# Patient Record
Sex: FEMALE | Race: WHITE | NOT HISPANIC OR LATINO | URBAN - METROPOLITAN AREA
[De-identification: names, ages, dates, MRNs, and addresses within clinical notes are randomized per-mention and may not be internally consistent; named-entity substitution may affect disease eponyms.]

---

## 2024-04-22 ENCOUNTER — OFFICE VISIT (OUTPATIENT)
Dept: SPORTS MEDICINE | Facility: CLINIC | Age: 59
End: 2024-04-22
Payer: COMMERCIAL

## 2024-04-22 VITALS
BODY MASS INDEX: 23.79 KG/M2 | DIASTOLIC BLOOD PRESSURE: 62 MMHG | SYSTOLIC BLOOD PRESSURE: 108 MMHG | HEIGHT: 59 IN | HEART RATE: 62 BPM | WEIGHT: 118 LBS

## 2024-04-22 DIAGNOSIS — M48.07 NEURAL FORAMINAL STENOSIS OF LUMBOSACRAL SPINE: ICD-10-CM

## 2024-04-22 DIAGNOSIS — M62.89 HAMSTRING TIGHTNESS: ICD-10-CM

## 2024-04-22 DIAGNOSIS — M48.00 CENTRAL STENOSIS OF SPINAL CANAL: Primary | ICD-10-CM

## 2024-04-22 DIAGNOSIS — M54.42 ACUTE MIDLINE LOW BACK PAIN WITH LEFT-SIDED SCIATICA: ICD-10-CM

## 2024-04-22 DIAGNOSIS — M51.36 BULGING LUMBAR DISC: ICD-10-CM

## 2024-04-22 DIAGNOSIS — G96.191 TARLOV CYST: ICD-10-CM

## 2024-04-22 DIAGNOSIS — M53.3 COCCYGEAL PAIN: ICD-10-CM

## 2024-04-22 DIAGNOSIS — M99.04 SACROILIAC JOINT SOMATIC DYSFUNCTION: ICD-10-CM

## 2024-04-22 DIAGNOSIS — G96.191 TARLOV CYSTS: ICD-10-CM

## 2024-04-22 DIAGNOSIS — M24.559 HIP FLEXOR TIGHTNESS, UNSPECIFIED LATERALITY: ICD-10-CM

## 2024-04-22 DIAGNOSIS — M51.36 ANNULAR TEAR OF LUMBAR DISC: ICD-10-CM

## 2024-04-22 DIAGNOSIS — S33.2XXD DISLOCATION OF COCCYX, SUBSEQUENT ENCOUNTER: ICD-10-CM

## 2024-04-22 DIAGNOSIS — M21.079 VALGUS FOOT, UNSPECIFIED LATERALITY: ICD-10-CM

## 2024-04-22 DIAGNOSIS — S39.012D LUMBOSACRAL STRAIN, SUBSEQUENT ENCOUNTER: ICD-10-CM

## 2024-04-22 DIAGNOSIS — M51.16 LUMBAR DISC HERNIATION WITH RADICULOPATHY: ICD-10-CM

## 2024-04-22 DIAGNOSIS — M21.70 LEG LENGTH DISCREPANCY: ICD-10-CM

## 2024-04-22 DIAGNOSIS — M54.10 RADICULITIS WITH LOWER EXTREMITY SYMPTOMS: ICD-10-CM

## 2024-04-22 PROBLEM — S39.012A LUMBOSACRAL STRAIN: Status: ACTIVE | Noted: 2024-04-22

## 2024-04-22 PROBLEM — S33.2XXA: Status: ACTIVE | Noted: 2024-04-22

## 2024-04-22 PROBLEM — M51.369 BULGING LUMBAR DISC: Status: ACTIVE | Noted: 2024-04-22

## 2024-04-22 PROBLEM — M51.369 ANNULAR TEAR OF LUMBAR DISC: Status: ACTIVE | Noted: 2024-04-22

## 2024-04-22 PROCEDURE — 99214 OFFICE O/P EST MOD 30 MIN: CPT | Mod: 25 | Performed by: FAMILY MEDICINE

## 2024-04-22 PROCEDURE — 1036F TOBACCO NON-USER: CPT | Performed by: FAMILY MEDICINE

## 2024-04-22 PROCEDURE — 99214 OFFICE O/P EST MOD 30 MIN: CPT | Performed by: FAMILY MEDICINE

## 2024-04-22 PROCEDURE — 2500000004 HC RX 250 GENERAL PHARMACY W/ HCPCS (ALT 636 FOR OP/ED): Performed by: FAMILY MEDICINE

## 2024-04-22 PROCEDURE — 96372 THER/PROPH/DIAG INJ SC/IM: CPT | Performed by: FAMILY MEDICINE

## 2024-04-22 RX ORDER — MULTIVIT-MINS NO.7/FOLIC ACID 1 MG
1 CAPSULE ORAL
COMMUNITY
Start: 2024-01-22

## 2024-04-22 RX ORDER — PREDNISONE 10 MG/1
10 TABLET ORAL DAILY
Qty: 30 TABLET | Refills: 0 | Status: SHIPPED | OUTPATIENT
Start: 2024-04-22 | End: 2024-05-22

## 2024-04-22 RX ORDER — TRAMADOL HYDROCHLORIDE 50 MG/1
50 TABLET ORAL EVERY 8 HOURS PRN
Qty: 15 TABLET | Refills: 0 | Status: SHIPPED | OUTPATIENT
Start: 2024-04-22 | End: 2024-04-27

## 2024-04-22 RX ORDER — METHYLPREDNISOLONE ACETATE 40 MG/ML
40 INJECTION, SUSPENSION INTRA-ARTICULAR; INTRALESIONAL; INTRAMUSCULAR; SOFT TISSUE ONCE
Status: COMPLETED | OUTPATIENT
Start: 2024-04-22 | End: 2024-04-22

## 2024-04-22 RX ORDER — LEVOTHYROXINE SODIUM 88 UG/1
88 TABLET ORAL
COMMUNITY
Start: 2023-07-25

## 2024-04-22 RX ORDER — BUSPIRONE HYDROCHLORIDE 5 MG/1
5 TABLET ORAL 2 TIMES DAILY
COMMUNITY

## 2024-04-22 RX ORDER — KETOROLAC TROMETHAMINE 30 MG/ML
60 INJECTION, SOLUTION INTRAMUSCULAR; INTRAVENOUS ONCE
Status: DISCONTINUED | OUTPATIENT
Start: 2024-04-22 | End: 2024-04-22

## 2024-04-22 RX ORDER — ACETAMINOPHEN 325 MG/1
650 TABLET ORAL EVERY 6 HOURS PRN
COMMUNITY

## 2024-04-22 RX ORDER — CYCLOBENZAPRINE HCL 10 MG
1 TABLET ORAL
COMMUNITY
Start: 2023-03-30 | End: 2024-04-25 | Stop reason: SDUPTHER

## 2024-04-22 RX ORDER — ORPHENADRINE CITRATE 30 MG/ML
60 INJECTION INTRAMUSCULAR; INTRAVENOUS ONCE
Status: COMPLETED | OUTPATIENT
Start: 2024-04-22 | End: 2024-04-22

## 2024-04-22 RX ORDER — ESCITALOPRAM OXALATE 5 MG/1
5 TABLET ORAL EVERY 24 HOURS
COMMUNITY
Start: 2023-02-06

## 2024-04-22 RX ADMIN — ORPHENADRINE CITRATE 60 MG: 60 INJECTION INTRAMUSCULAR; INTRAVENOUS at 13:17

## 2024-04-22 RX ADMIN — METHYLPREDNISOLONE ACETATE 40 MG: 40 INJECTION, SUSPENSION INTRA-ARTICULAR; INTRALESIONAL; INTRAMUSCULAR; SOFT TISSUE at 13:16

## 2024-04-22 RX ADMIN — METHYLPREDNISOLONE SODIUM SUCCINATE 125 MG: 125 INJECTION, POWDER, FOR SOLUTION INTRAMUSCULAR; INTRAVENOUS at 13:16

## 2024-04-22 ASSESSMENT — PAIN SCALES - GENERAL
PAINLEVEL_OUTOF10: 6
PAINLEVEL: 6

## 2024-04-22 ASSESSMENT — PAIN DESCRIPTION - DESCRIPTORS: DESCRIPTORS: ACHING;THROBBING;SORE;TIGHTNESS

## 2024-04-22 ASSESSMENT — ENCOUNTER SYMPTOMS
LOSS OF SENSATION IN FEET: 0
DEPRESSION: 0
OCCASIONAL FEELINGS OF UNSTEADINESS: 0

## 2024-04-22 ASSESSMENT — LIFESTYLE VARIABLES
HOW OFTEN DO YOU HAVE A DRINK CONTAINING ALCOHOL: 2-4 TIMES A MONTH
HOW MANY STANDARD DRINKS CONTAINING ALCOHOL DO YOU HAVE ON A TYPICAL DAY: 1 OR 2
AUDIT TOTAL SCORE: 2
HAVE YOU OR SOMEONE ELSE BEEN INJURED AS A RESULT OF YOUR DRINKING: NO
SKIP TO QUESTIONS 9-10: 1
HAS A RELATIVE, FRIEND, DOCTOR, OR ANOTHER HEALTH PROFESSIONAL EXPRESSED CONCERN ABOUT YOUR DRINKING OR SUGGESTED YOU CUT DOWN: NO
HOW OFTEN DO YOU HAVE SIX OR MORE DRINKS ON ONE OCCASION: NEVER
AUDIT-C TOTAL SCORE: 2

## 2024-04-22 ASSESSMENT — PAIN - FUNCTIONAL ASSESSMENT: PAIN_FUNCTIONAL_ASSESSMENT: 0-10

## 2024-04-22 ASSESSMENT — PATIENT HEALTH QUESTIONNAIRE - PHQ9
1. LITTLE INTEREST OR PLEASURE IN DOING THINGS: NOT AT ALL
2. FEELING DOWN, DEPRESSED OR HOPELESS: NOT AT ALL
SUM OF ALL RESPONSES TO PHQ9 QUESTIONS 1 AND 2: 0

## 2024-04-22 NOTE — PROGRESS NOTES
Verbal consent of the patient and/or verbal parental consent for patients under the age of 18 have been obtained to conduct a physical examination at this office visit.    Established patient  History Of Present Illness  04/22/24 Nelda Parra is a 58 y.o. female who presents for reevaluation of LUMBAR spine. The patient reports reinjuring her low back after lifting a 5 gallon jug of wine yesterday afternoon. She reports feeling a pop and tightness across her entire lower back with excruciating pain which radiated down her legs RIGHT > LEFT causing her to fall to the floor.    She says her  had to help her off of the floor because of the pain.    She reached out to me over the weekend through the answering service yesterday and I had her start icing and putting heat on the area as well as taking some leftover muscle relaxant she had and the Tylenol.  She states after taking several doses of Tylenol, cyclobenzaprine, and using her heating pad that she has some improvement to the pain. The pain currently is located on the RIGHT side of her lower back more than the LEFT with some pain wrapping around the hips. She denies any radicular pain or symptoms in her legs at this time. The pain is 6/10 at rest but will increase with walking, twisting or bending motions. She continues to wear her heel lift in her LEFT shoe as previously directed. We discussed her mechanism of injury and reviewed proper lifting techniques and body posture. She has thoracic and lumbar side bending to the LEFT due to RIGHT paraspinal muscle spasms. She reports some decreased sensation in the dermatomes of L4-L5 and L5-S1 on the RIGHT and likely re-aggravated her L4 disc.   Her daughter is getting  this weekend who is also a sports medicine physician Dr. Amina Parra however she does not want her to know that her mom flared up her back again because then she will be worried about it.  We talked in detail about getting her back into  physical therapy her  is currently in physical therapy so we will going to switch up the appointments and have her take his place and his therapy appointments this week to get some traction in the future we will going to update her x-rays as well as her MRI of her lumbar spine.    All previous Progress Notes and imaging results related to this patients chief complaint have been reviewed in preparation for this examination.    Past Medical History  She has no past medical history on file.    Surgical History  She has a past surgical history that includes MR arthrogram shoulder right w FL guided injection (Right, 12/18/2013) and MR angio head wo IV contrast (1/15/2020).     Social History  She reports that she has never smoked. She has never used smokeless tobacco. No history on file for alcohol use and drug use.    Family History  No family history on file.     Allergies  Nsaids (non-steroidal anti-inflammatory drug), Naproxen, and Sulfa (sulfonamide antibiotics)    Historical Clinical Intake  Patient is a 51-year old  female. She has been seen in this office in the past for shoulder pain. Patient is here today for low back pain which she has been dealing with off and on for years. She is also had therapy off and on for years for her back but she is always had some vague pain. She states that about a week ago, she was helping move her grandmother from a wheelchair into her bed when she felt a pull in her left low back. She states that her pain has been getting progressively worse. She says that yesterday she was not able to move at all due to the pain. She states that she has sharp pain that shoots down her left leg with any movement of her back. She states that her pain at rest is 3/10 and with movement it is a sharp 7/10. Patient states that she has taken Tramadol but that did not take the edge off of her pain at all. She states that she has a cold and is on antibiotics, as well as taking NyQuil,  which has been helping her get some sleep. Patient states that she recalls hurting her back, in the same area, about a year ago, but was having no pain before this recent incident. She's also been taken anti-inflammatory's regularly as well as placing ice and heat on the area but not getting any better.  ------------  March 6, 2017. Nelda is here to go over her MRI of her lumbar spine. She says her back is feeling a little bit better. She says whenever she does lift some stuff up she gets pain in her lower back but nowhere near as bad as it did previously. She denies any current numbness, tingling, or pins and needle sensation however she does still get some going down her leg whenever her back is bothering her. She says she just got her new shoes recently as well as her insoles and they definitely make a difference. He's had a couple sessions of therapy so far and it's definitely helped her with her overall pain during the day and her quality of life  ------------------------------  August 14, 2020 above notes reviewed. Patient is here for reevaluation of re-injury of low back. She states that she re-injured her back while scrubbing her bathroom tile on her hands and knees and had increased pain when she stood up approximately 2 weeks. She complains of mild pain radiating down her left leg with numbness and tingling. Patient c/o tightness across low back. She also complains of coccyx pain which she has had for months. She has been performing stretching on her own at home and using hot packs. She states that she just drove several hours in a car while on vacation and did not have any significant increase in symptoms. Pain is currently 2/10. She found relief with the Prednisone, Flexeril and Norco. I suggested she alternate with Tylenol. I gave her a handout on PT exercises to be done at home since she is going out of town soon and we discussed the possibility of additional PT in the future however she only found  mild relief from PT in the past.  ------------------------------------------------------------------------------  March 14, 2022-- Above notes reviewed. Verbal consent of the patient and/or verbal parental consent for patients under the age of 18 have been obtained to conduct a physical examination at this office visit.Nelda is here for reevaluation of her low back. She states that on Saturday she bent to  a box that weighed approximately 40-50lbs and lifted it a few inches off the ground and felt immediate pain across her low back. She has been using heat and ice , rest, and stretching since her reinjury. She was feeling better, but then on Saturday evening she reached over to pet the dog and then went to the rest room and could barely stand from the toilet due to pain. Pain is right side and is radiating into the front of her right hip and wraps into her groin. Pain is currently 2-3/10 at rest, 10/10 with movement.  -----------------------------------------  June 7, 2023---- above notes reviewed. Verbal consent of the patient and/or verbal parental consent for patients under the age of 18 have been obtained to conduct a physical examination at this office visit. She reinjured low back when bending forward this morning. She felt her lower back tighten up immediately. She is very guarded with her movements today. Nelda has been applying ice and moist heat since this morning and also took Tylenol, one Advil, and a muscle relaxer that she had been prescribed previously. She denies any numbness/tingling, but does have some radicular pain going down into her buttocks bilaterally. Pain is minimal at rest, but can get up to 7/10 particularly when transitioning from sit to stand or trying to twist. Her LBP was 10/10 earlier this morning. She is leaving to go out of town tomorrow and is concerned about traveling as she will be in a car for several hours. Nelda has been diligently performing the home  exercises that were given to her peviously by her physical therapist. Additonally, she is wearing shoes with good support as well as OTC insoles.     General Examination:  GENERAL APPEARANCE: appears well, pleasant, and cooperative, NAD.   HEENT: normal, unremarkable.   NECK: supple.   HEART: RRR, normal S1S2.   LUNGS: clear to auscultation bilaterally.   ABDOMEN: soft, NT/ND, BS present.   EXTREMITIES: no cyanosis, clubbing.   SKIN: no rash or cellulitis.   NEUROLOGIC EXAM: awake, A&O x 3, CN's II-XII grossly intact.   PSYCH: good eye contact, appropriate mood and affect .   The , Rosalina, was present in the room during the entire visit including, but not limited to the physical examination!.    General (SPINE):  Location:  LUMBAR .    Erythema: Negative.   Edema: Negative.   Effusion: Negative.   TART Findings: POSITIVE , Tissue Texture Changes, Assymmetry, Restriction, Tenderness through L4 and L5 articular pillars, paraspinal muscle spasms RIGHT > LEFT  Warmth: Negative.   Ecchymosis/Bruising: Negative.   Tuning Fork Test (LUMBAR): Negative.   Abrasions: Negative.   Orientation (LUMBAR): POSITIVE  Decreased, Lumbar, Lordosis, because of muscle spasms. Lumbar and thoracic shifted to the RIGHT due to paraspinals RIGHT.   ROM (LUMBAR): POSITIVE Decreased, Forward Flexion, Extension b/c of pain and muscle spasms, extension is worse than flexion.     Muscle Strength: POSITIVE    Causes pain with testing of hamstrings, quadriceps, and hip flexion and extension  +5/+5 Hamstring Flexion  +5/+5 Quadricep Extension  +5/+5 Hip Flexion  +5/+5 Hip Extension  +5/+5 Hip ABduction toward body  +5/+5 Hip ADduction away from body  +5/+5 Hip Internal Rotation at 90 Degrees  +5/+5 Hip External Rotation at 90 Degrees  +5/+5 Hip Internal Rotation at 0 Degrees  +5/+5 Hip External Rotation at 0 Degrees.            DTR/Neurological: 2-Point Discrimination:  Negative,Toe Walk normal,Heel Walk normal   +2/+4 Patellar  Reflex (L-4)  +2/+4 Posterior Tibialis and Medial Hamstrings Reflex (L5)  +2/+4 Achilles Reflex (S-1).            Sensation/Neurological Lumbar:    Positive Sensation  decreased right side   Negative L1: Low back, hips, and groin  Negative L2: Low back and front of inside of upper leg/thigh  Negative L3: Low back and front of upper leg/thigh  Positive L4: Low back, front of upper leg/thigh, front of lower leg/calf, front of medial area of knee, and inside of ankle Decreased  Positive L5: Low back, front and lateral knee, front and outside of lower leg/calf, top and bottom of foot and first four toes especially big toe. Decreased           Sensation/Neurological Sacrum:   Positive Sensation  decreased right side   Positive S1: low back, back of upper leg/thigh, back and inside of lower leg, calf and little toe Decreased  Negative S2: Buttocks, genitals, back of upper leg/thigh and calves  Negative S3: Buttocks and genitals  Negative S4: Buttocks  Negative S5: Buttocks.     Sensation/Neurological Coccygeal:   Negative Sensation Intact, 2-Point Discrimination: Negative   Negative Coccyx: Buttocks and area of tailbone.     Palpation: Positive  Tender to Palpation over the Right lower Lumbar Spine as well as the Paraspinal Musculature, and SI joint            Vascular:   Capillary Refill < 2 seconds  +2/+4Carotid  +2/+4 Dorsalis Pedis  +2/+4 Posterior Tibial.                 Diagnostic Imaging Review:  Radiology Reviewed by Radiologist and Myself:   PROCEDURE: SPINE LUMBAR WO CONTRAST - TMR 2010  REASON FOR EXAM: ACUTE MIDLINE LOW BACK PAIN WITH LEFT-SIDED SCIATICA            RESULT: SPINE LUMBAR WO CONTRAST: 9/10/2020 4:04 PM            CLINICAL HISTORY: M [age] lumbago and sciatica left side. Strain of muscle below back. S 39.012. Low back pain radiating into both legs.            COMPARISON: MRI lumbar spine 2/22/2017            TECHNIQUE:  Multiecho and multiplanar imaging of the lumbar spine was performed.             FINDINGS: No bone marrow edema. No compression fracture or subluxation. In the right foramen at T11-12 there is a 1.5 cm nerve root cyst/Tarlov cyst.            T12-L1: Unremarkable            L1-2: Loss of disc space height and disc desiccation. Small right paracentral disc protrusion measures 4 mm AP and mildly effaces the ventral right side of the thecal sac causing minimal canal stenosis. No foraminal stenosis.            L2-3: Unremarkable            L3-4: Unremarkable            L4-5: Small broad-based disc bulge. No canal or foraminal stenosis.            L5-S1: Small disc bulge with superimposed left paracentral small protrusion. Disc bulge into the left foramen causes mild left foraminal stenosis. The right foramen is maintained. No canal stenosis.            IMPRESSION SPINE LUMBAR:  1. L1-2 has minimal canal stenosis due to a right paracentral disc herniation.  2. L4-5: Small broad-based disc bulge.  3. L5-S1 has a small left paracentral disc herniation and mild left foraminal stenosis.  4. right foramen at T11-12 there is a 1.5 cm nerve root cyst/Tarlov cyst            This report has been produced using speech recognition.            Original Interpreting Physician: DANIEL MILLER MD  Original Transcribed by/Date: [facility] Sep 10 2020 4:47P  ---------------------------------------  PROCEDURE: SPINE LUMBAR WO CONTRAST - CMR 2010  REASON FOR EXAM: M54.42            RESULT: Clinical Information: Lower back pain radiating down to left leg for about two weeks after moving furniture.            MRI of lumbar spine without contrast: 2/22/2017            Technique: T1 and T2-weighted sagittal and axial, and STIR sagittal images were obtained.            Findings: The conus medullaris appears normal.            The vertebral body heights, disc space heights and alignments are well maintained within no compression fracture, subluxation or bony destructive lesion.            T12-L1: Normal.            L1-2: There is a  mild right paramedian disc bulging causing right ventral thecal flattening, but no herniation of nucleus pulposus, significant spinal stenosis or neural foraminal stenosis.            L2-3, L3-4 and L4-5: Unremarkable with no herniated disc, spinal stenosis or neural foraminal stenosis.            L5-S1, a tiny focal left paramedian annular tear but no herniation of nucleus pulposus, spinal stenosis or neural foraminal stenosis.            IMPRESSION SPINE LUMBAR :  1. A tiny focal left paramedian annular tear without  herniation of nucleus pulposus, spinal stenosis or neural foraminal stenosis at L5-S1.  2. Slight right paramedian disc bulging causing right ventral thecal flattening at L1-2 but no herniation of nucleus pulposus, spinal stenosis or neural foraminal stenosis.            Original Interpreting Physician: DIANNE FAULKNER M.D.  Original Transcribed by/Date: [facility] Feb 23 2017 9:32A  ---------------------------------------  PROCEDURE: SPINE LUMBOSACRAL MIN 4 VIEW - AXR 0034  REASON FOR EXAM: M[age] acute midline low back pain with left side sciatica            RESULT: SPINE LUMBOSACRAL MIN 4 VIEW 2/14/2017 11:47 AM  51 years Female            CLINICAL INFORMATION: Acute low back pain with left-sided sciatica            AP lateral and oblique views of the lumbar spine show normal alignment.  There is no intrinsic bony abnormality. The disc spaces are preserved. The apophyseal joints are unremarkable in the oblique views. Normal paraspinal soft tissues. .            IMPRESSION SPINE LUMBOSACRAL: Normal exam. .            This report has been produced using speech recognition.            Original Interpreting Physician: MARÍA JOHNSON M.D  ---------------------------------------  Left leg is 6.4 mm shorter than right leg on standing erect pelvis x-ray            PROCEDURE: PELVIS 1 OR 2 VIEW - AXR 0039  REASON FOR EXAM: M21.70 leg length discrepancy            RESULT: PELVIS 1 OR 2 VIEW 2/14/2017 11:47 AM  51 years  Female            Clinical Information: Leg length discrepancy            An AP erect view of the pelvis was obtained. The right hip is 5 mm higher than the left hip. The joint spaces are preserved. The SI joints are normal.  No intrinsic bony abnormality is seen.            IMPRESSION: Right hip is 5 mm higher than the left.            This report has been produced using speech recognition.            Original Interpreting Physician: MARÍA JOHNSON M.D..     all findings reaggravated.  Range of Motion of the Spine:  Overview of Tests for Evaluating Spinal Function: Negative.   Mfoazkyqyl-ja-ccujl Distance Test in Flexion: Positive reaggravated.   Meseret Sign: Negative.   Schober Sign: Negative.   Skin-Rolling Test (Concepción Fold Test): Negative.   all findings reaggravated.    Lumbar Spine Tests: all findings reaggravated  Cross Straight Leg: Positive refers pain right lumbar spine  Straight Leg Raise: Positive refers pain right lumbar spine  Seated Straight Leg Raise: Positive refers pain right lumbar spine  One-Leg Standing (Stork Standing), Lumbar Extension Test: Negative.   Stork Test: Negative.   Sphinx Test: Negative.   Spinous Process Tap Test: Negative.   Standing Flexion Test:  Positive. RIGHT sacral somatic dysfunction LEFT rotation on LEFT axis.   Seated Flexion Test: Positive. RIGHT sacral somatic dysfunction LEFT rotation on LEFT axis.   Spring Test: Negative.   Springing Test 1: Negative.   Yeoman Test: Negative.   Bettie Test: Negative.   Sacroiliac Joint Springing Test 2: Negative.   Derbolowsky Sign: Negative.   Lhermitte's Sign: Negative.   Slump Test: Positive: BILATERAL RIGHT >LEFT  .   SLR: Positive: BILATERAL RIGHT >LEFT  .   Lasegue Sign (Straight Leg Raising Test): Positive: BILATERAL RIGHT >LEFT .   Lasegue Drop (Rebound) Test: Negative.   Lasegue Test with the Patient Seated: Negative.   Lasegue Differential Test: Negative.   Bonnet Sign (Piriformis Sign): Negative.   Bragard Test: Negative.    Duchenne Sign: Negative.   Kernig-Brudzinski Test: Negative.   Tiptoe and Heel Walking Test: Negative.   Femoral Nerve Traction Test: Positive. RIGHT > LEFT .   Reverse Lasegue Test ( Femoral Nerve Lasegue Test): Negative.   Gan Test: Negative.   Jesus Sign-Prone Knee Flexion Test: Positive reaggravated .   Trendelenburg: Negative.   Luis Carlos Test ( Sanjay's Test): Negative.   Ligament Tests: Negative.   Psoas Sign: Positive reaggravated .   Gaenslen Sign ( 2nd Mennell Sign): Negative.   Iliac Compression Test: Negative.   1st Mennell Sign: Negative.   all findings reaggravated.    Assessment   1. Central stenosis of spinal canal        2. Acute midline low back pain with left-sided sciatica  XR lumbar spine complete 4+ views    MR lumbar spine wo IV contrast    methylPREDNISolone acetate (DEPO-Medrol) injection 40 mg    methylPREDNISolone sod succinate (SOLU-Medrol) injection 125 mg    orphenadrine (Norflex) injection 60 mg    Referral to Physical Therapy    traMADol (Ultram) 50 mg tablet    predniSONE (Deltasone) 10 mg tablet    DISCONTINUED: ketorolac (Toradol) injection 60 mg      3. Lumbosacral strain, subsequent encounter  XR lumbar spine complete 4+ views    MR lumbar spine wo IV contrast    methylPREDNISolone acetate (DEPO-Medrol) injection 40 mg    methylPREDNISolone sod succinate (SOLU-Medrol) injection 125 mg    orphenadrine (Norflex) injection 60 mg    Referral to Physical Therapy    traMADol (Ultram) 50 mg tablet    predniSONE (Deltasone) 10 mg tablet    DISCONTINUED: ketorolac (Toradol) injection 60 mg      4. Lumbar disc herniation with radiculopathy  XR lumbar spine complete 4+ views    MR lumbar spine wo IV contrast    methylPREDNISolone acetate (DEPO-Medrol) injection 40 mg    methylPREDNISolone sod succinate (SOLU-Medrol) injection 125 mg    orphenadrine (Norflex) injection 60 mg    Referral to Physical Therapy    traMADol (Ultram) 50 mg tablet    predniSONE (Deltasone) 10 mg tablet     DISCONTINUED: ketorolac (Toradol) injection 60 mg      5. Bulging lumbar disc  XR lumbar spine complete 4+ views    MR lumbar spine wo IV contrast    methylPREDNISolone acetate (DEPO-Medrol) injection 40 mg    methylPREDNISolone sod succinate (SOLU-Medrol) injection 125 mg    orphenadrine (Norflex) injection 60 mg    Referral to Physical Therapy    traMADol (Ultram) 50 mg tablet    predniSONE (Deltasone) 10 mg tablet    DISCONTINUED: ketorolac (Toradol) injection 60 mg      6. Annular tear of lumbar disc  XR lumbar spine complete 4+ views    MR lumbar spine wo IV contrast    methylPREDNISolone acetate (DEPO-Medrol) injection 40 mg    methylPREDNISolone sod succinate (SOLU-Medrol) injection 125 mg    orphenadrine (Norflex) injection 60 mg    Referral to Physical Therapy    traMADol (Ultram) 50 mg tablet    predniSONE (Deltasone) 10 mg tablet    DISCONTINUED: ketorolac (Toradol) injection 60 mg      7. Radiculitis with lower extremity symptoms  XR lumbar spine complete 4+ views    MR lumbar spine wo IV contrast    methylPREDNISolone acetate (DEPO-Medrol) injection 40 mg    methylPREDNISolone sod succinate (SOLU-Medrol) injection 125 mg    orphenadrine (Norflex) injection 60 mg    Referral to Physical Therapy    traMADol (Ultram) 50 mg tablet    predniSONE (Deltasone) 10 mg tablet    DISCONTINUED: ketorolac (Toradol) injection 60 mg      8. Tarlov cyst  XR lumbar spine complete 4+ views    MR lumbar spine wo IV contrast    methylPREDNISolone acetate (DEPO-Medrol) injection 40 mg    methylPREDNISolone sod succinate (SOLU-Medrol) injection 125 mg    orphenadrine (Norflex) injection 60 mg    Referral to Physical Therapy    traMADol (Ultram) 50 mg tablet    predniSONE (Deltasone) 10 mg tablet    DISCONTINUED: ketorolac (Toradol) injection 60 mg      9. Sacroiliac joint somatic dysfunction  XR lumbar spine complete 4+ views    MR lumbar spine wo IV contrast    methylPREDNISolone acetate (DEPO-Medrol) injection 40 mg     methylPREDNISolone sod succinate (SOLU-Medrol) injection 125 mg    orphenadrine (Norflex) injection 60 mg    Referral to Physical Therapy    traMADol (Ultram) 50 mg tablet    predniSONE (Deltasone) 10 mg tablet    DISCONTINUED: ketorolac (Toradol) injection 60 mg      10. Hip flexor tightness, unspecified laterality  XR lumbar spine complete 4+ views    MR lumbar spine wo IV contrast    methylPREDNISolone acetate (DEPO-Medrol) injection 40 mg    methylPREDNISolone sod succinate (SOLU-Medrol) injection 125 mg    orphenadrine (Norflex) injection 60 mg    Referral to Physical Therapy    traMADol (Ultram) 50 mg tablet    predniSONE (Deltasone) 10 mg tablet    DISCONTINUED: ketorolac (Toradol) injection 60 mg      11. Hamstring tightness  XR lumbar spine complete 4+ views    MR lumbar spine wo IV contrast    methylPREDNISolone acetate (DEPO-Medrol) injection 40 mg    methylPREDNISolone sod succinate (SOLU-Medrol) injection 125 mg    orphenadrine (Norflex) injection 60 mg    Referral to Physical Therapy    traMADol (Ultram) 50 mg tablet    predniSONE (Deltasone) 10 mg tablet    DISCONTINUED: ketorolac (Toradol) injection 60 mg      12. Coccygeal pain  XR lumbar spine complete 4+ views    MR lumbar spine wo IV contrast    methylPREDNISolone acetate (DEPO-Medrol) injection 40 mg    methylPREDNISolone sod succinate (SOLU-Medrol) injection 125 mg    orphenadrine (Norflex) injection 60 mg    Referral to Physical Therapy    traMADol (Ultram) 50 mg tablet    predniSONE (Deltasone) 10 mg tablet    DISCONTINUED: ketorolac (Toradol) injection 60 mg      13. Dislocation of coccyx, subsequent encounter  XR lumbar spine complete 4+ views    MR lumbar spine wo IV contrast    methylPREDNISolone acetate (DEPO-Medrol) injection 40 mg    methylPREDNISolone sod succinate (SOLU-Medrol) injection 125 mg    orphenadrine (Norflex) injection 60 mg    Referral to Physical Therapy    traMADol (Ultram) 50 mg tablet    predniSONE (Deltasone) 10 mg  tablet    DISCONTINUED: ketorolac (Toradol) injection 60 mg      14. Leg length discrepancy        15. Valgus foot, unspecified laterality        16. Neural foraminal stenosis of lumbosacral spine        17. Tarlov cysts            Treatment or Intervention:  Continue ice and moist heat as needed  Reviewed home stretching exercises to be done by the patient regularly  Recommended to brace core and back prior to standing and reviewed proper lifting techniques  Continue to take previously prescribed cyclobenzaprine three times daily as needed  Patient was given a BACK brace for their LUMBAR injury/condition. The patient is ambulatory with or without aid and feels more stable with the brace on. The brace definitely helps improve their function as well as stability. Verbal and written instructions for the use, wear schedule, cleaning, and application of this brace were given. Patient was instructed that should the brace result in increased pain, decreased sensation, numbness and/or tingling, increased swelling, or an overall worsening of their medical condition; to please contact our office immediately. Orthotic/brace management and training was provided for skin care, modifications due to healing tissues, edema changes, interruption in skin integrity, and safety precautions with the Orthosis/brace.   Recommended to wear compression shirt underneath of back brace  May continue to alternate ice and moist heat as needed    Reviewed herniated/bulging disc(s) injury in detail with the patient to the level of their understanding at the time of this office visit.    Start into physical therapy 1-2 times a week for 10-12 weeks with manual therapy, dry needling, IASTM, and traction    discussed case ahead of time with physical therapy who additionally stopped into the room to discuss with patient ahead of time  Stressed the importance of wearing shoes with good stability control to help with the biomechanics affecting the spine     Stressed the importance of wearing full foot insoles to help with the biomechanics affecting the spine and to provide cushioning  Recommendation over-the-counter calcium with vitamin-D 2 -3000+ milligrams a day, as well as OTC symphytum as directed daily to promote bony healing, in addition to a daily multivitamin.    The following prescription was electronically sent to the patient's pharmacy of record: Prednisone 10mg 1 pill a day with food for 30 days; start tomorrow if received Solu-Medrol injection in the office at visit, otherwise start immediately, dispense quantity sufficient, with no refills   as well as Flexeril 10 mg 1 pill 1 to 2 hours before bedtime dispense 30 with 1 refill  TREATMENT PLAN: Patient received IM injection of Norflex 60 milligrams at the time of this office visit., Patient received IM injection of SoluMedrol 125 milligrams at the time of this office visit. ,Patient received IM injection of DepoMedrol 40 milligrams at the time of this office visit., and  The patient tolerated the injection(s) well and without any adverse effects as observed, verified s/p 15 minutes.    The following prescrption was sent electronically to the patient's pharmacy of record: Tramadol 50 milligrams, may take 1 tablet every 8 hours as needed for severe pain, Duration: 5 days, Dispense #15, Refill: 0.  and I or my designee accessed OAS today to monitor the patients controlled substance history. Reviewed, discussed, and educated patient and/or patients legal guardian about prescribed Opioid/Controlled Substance and had patients and/or patients legal guardian sign Opioid/Controlled Substance Prescription Agreement Consent Form. Additionally, I also discussed in detail with the patient and/or the patients legal guardian to the level of their understanding the risks of addiction and overdose associated with controlled substances. The patient and/or their legal guardian verbalized their understanding at the time  of this office visit. Additionally, signed Opioid/Controlled Substance Prescription Staff Checklist Form which was performed by staff at the time of this office visit.   Recommendation over-the-counter curcumin, turmeric, boswellia, as well as egg shell membrane as directed to aid with joint inflammation.  Recommendation over-the-counter Move Free for joint health.    May take OTC Tylenol Extra Strength or OTC Tylenol Arthritis, taking 1-2 every 6-8 hours with food as needed for pain management   XR and MRI of the LUMBAR spine to rule out herniated disc versus stress fracture versus other to be completed after wedding  You have been ordered an MRI of the LUMBAR spine. Once you contact scheduling at [phone] and obtain the date and time of your MRI, contact our office at [phone] to schedule your follow-up appointment to review your results. Please note the results of your imaging will not be discussed over the telephone.   possibility of more osteopathic manipulative therapy treatment in the future  Recommended she use e-stim to the RIGHT paraspinals  Recommended she remain out of work until further notice  Follow-up after wedding as needed. Also contact the office for nurse visit if another round of injections needed prior to wedding.  Please note that this report has been produced using speech recognition software.  It may contain errors related to grammar, punctuation or spelling.  Electronically signed, but not reviewed.  GRACIE Martin, Director of Sports Medicine     CAROLEE SHERWOOD on 4/22/24 at 6:21 PM.     BRIAN Martin DO

## 2024-04-22 NOTE — LETTER
April 22, 2024     Patient: Nelda Parra   YOB: 1965   Date of Visit: 4/22/2024       To Whom It May Concern:    It is my medical opinion that Nelda Parra should remain out of work until further notice for back injury and procedure .    If you have any questions or concerns, please don't hesitate to call.         Sincerely,        Javier Junior, DO    CC: No Recipients

## 2024-04-22 NOTE — PATIENT INSTRUCTIONS
Continue ice and moist heat as needed  Reviewed home stretching exercises to be done by the patient regularly  Recommended to brace core and back prior to standing and reviewed proper lifting techniques  Continue to take previously prescribed cyclobenzaprine three times daily as needed  Patient was given a BACK brace for their LUMBAR injury/condition. The patient is ambulatory with or without aid and feels more stable with the brace on. The brace definitely helps improve their function as well as stability. Verbal and written instructions for the use, wear schedule, cleaning, and application of this brace were given. Patient was instructed that should the brace result in increased pain, decreased sensation, numbness and/or tingling, increased swelling, or an overall worsening of their medical condition; to please contact our office immediately. Orthotic/brace management and training was provided for skin care, modifications due to healing tissues, edema changes, interruption in skin integrity, and safety precautions with the Orthosis/brace.   Recommended to wear compression shirt underneath of back brace  May continue to alternate ice and moist heat as needed    Reviewed herniated/bulging disc(s) injury in detail with the patient to the level of their understanding at the time of this office visit.    Start into physical therapy 1-2 times a week for 10-12 weeks with manual therapy, dry needling, IASTM, and traction   Will discuss with physical therapist as appropriate  Stressed the importance of wearing shoes with good stability control to help with the biomechanics affecting the spine    Stressed the importance of wearing full foot insoles to help with the biomechanics affecting the spine and to provide cushioning  Recommendation over-the-counter calcium with vitamin-D 2 -3000+ milligrams a day, as well as OTC symphytum as directed daily to promote bony healing, in addition to a daily multivitamin.    The following  prescription was electronically sent to the patient's pharmacy of record: Prednisone 10mg 1 pill a day with food for 30 days; start tomorrow if received Solu-Medrol injection in the office at visit, otherwise start immediately, dispense quantity sufficient, with no refills    TREATMENT PLAN: Patient received IM injection of Norflex 60 milligrams at the time of this office visit., Patient received IM injection of SoluMedrol 125 milligrams at the time of this office visit. , Patient received IM injection of Toradol 60 milligrams at the time of this office visit., Patient received IM injection of DepoMedrol 40 milligrams at the time of this office visit., and  The patient tolerated the injection(s) well and without any adverse effects as observed, verified s/p 15 minutes.    The following prescrption was sent electronically to the patient's pharmacy of record: Tramadol 50 milligrams, may take 1 tablet every 8 hours as needed for severe pain, Duration: 5 days, Dispense #15, Refill: 0.  and I or my designee accessed OAS today to monitor the patients controlled substance history. Reviewed, discussed, and educated patient and/or patients legal guardian about prescribed Opioid/Controlled Substance and had patients and/or patients legal guardian sign Opioid/Controlled Substance Prescription Agreement Consent Form. Additionally, I also discussed in detail with the patient and/or the patients legal guardian to the level of their understanding the risks of addiction and overdose associated with controlled substances. The patient and/or their legal guardian verbalized their understanding at the time of this office visit. Additionally, signed Opioid/Controlled Substance Prescription Staff Checklist Form which was performed by staff at the time of this office visit.   Recommendation over-the-counter curcumin, turmeric, boswellia, as well as egg shell membrane as directed to aid with joint inflammation.  Recommendation  over-the-counter Move Free for joint health.    May take OTC Tylenol Extra Strength or OTC Tylenol Arthritis, taking one every 6-8 hours with food as needed for pain management   XR and MRI of the LUMBAR spine to rule out herniated disc versus stress fracture versus other to be completed after wedding  You have been ordered an MRI of the LUMBAR spine. Once you contact scheduling at [phone] and obtain the date and time of your MRI, contact our office at [phone] to schedule your follow-up appointment to review your results. Please note the results of your imaging will not be discussed over the telephone.   possibility of more osteopathic manipulative therapy treatment in the future  Recommended she use e-stim to the RIGHT paraspinals  Recommended she remain out of work until further notice  Follow-up after wedding as needed. Also contact the office for nurse visit if another round of injections needed prior to wedding.

## 2024-04-23 ENCOUNTER — EVALUATION (OUTPATIENT)
Dept: PHYSICAL THERAPY | Facility: CLINIC | Age: 59
End: 2024-04-23
Payer: COMMERCIAL

## 2024-04-23 DIAGNOSIS — M24.559 HIP FLEXOR TIGHTNESS, UNSPECIFIED LATERALITY: ICD-10-CM

## 2024-04-23 DIAGNOSIS — M62.89 HAMSTRING TIGHTNESS: ICD-10-CM

## 2024-04-23 DIAGNOSIS — M51.16 LUMBAR DISC HERNIATION WITH RADICULOPATHY: ICD-10-CM

## 2024-04-23 DIAGNOSIS — M54.10 RADICULITIS WITH LOWER EXTREMITY SYMPTOMS: ICD-10-CM

## 2024-04-23 DIAGNOSIS — S39.012D LUMBOSACRAL STRAIN, SUBSEQUENT ENCOUNTER: ICD-10-CM

## 2024-04-23 DIAGNOSIS — M99.04 SACROILIAC JOINT SOMATIC DYSFUNCTION: ICD-10-CM

## 2024-04-23 DIAGNOSIS — M53.3 COCCYGEAL PAIN: ICD-10-CM

## 2024-04-23 DIAGNOSIS — S33.2XXD DISLOCATION OF COCCYX, SUBSEQUENT ENCOUNTER: ICD-10-CM

## 2024-04-23 DIAGNOSIS — M51.36 ANNULAR TEAR OF LUMBAR DISC: ICD-10-CM

## 2024-04-23 DIAGNOSIS — M54.42 ACUTE MIDLINE LOW BACK PAIN WITH LEFT-SIDED SCIATICA: ICD-10-CM

## 2024-04-23 DIAGNOSIS — G96.191 TARLOV CYST: ICD-10-CM

## 2024-04-23 DIAGNOSIS — M51.36 BULGING LUMBAR DISC: ICD-10-CM

## 2024-04-23 PROCEDURE — 97012 MECHANICAL TRACTION THERAPY: CPT | Mod: GP

## 2024-04-23 PROCEDURE — 97161 PT EVAL LOW COMPLEX 20 MIN: CPT | Mod: GP

## 2024-04-23 ASSESSMENT — PAIN - FUNCTIONAL ASSESSMENT: PAIN_FUNCTIONAL_ASSESSMENT: 0-10

## 2024-04-23 ASSESSMENT — PAIN DESCRIPTION - DESCRIPTORS: DESCRIPTORS: ACHING

## 2024-04-23 ASSESSMENT — PAIN SCALES - GENERAL: PAINLEVEL_OUTOF10: 6

## 2024-04-23 NOTE — PROGRESS NOTES
"    Physical Therapy Evaluation    Patient Name: Nelda Parra  MRN: [id number]  Evaluation Date: 4/23/2024  Time Calculation  Start Time: 0915  Stop Time: 0955  Time Calculation (min): 40 min  PT Evaluation Time Entry  PT Evaluation (Low) Time Entry: 25  PT Modalities Time Entry  Mechanical Traction Time Entry: 15       Problem List Items Addressed This Visit             ICD-10-CM    Dislocation of coccyx S33.2XXA    Relevant Orders    Follow Up In Physical Therapy    Hip flexor tightness M24.559    Relevant Orders    Follow Up In Physical Therapy    Tarlov cyst G96.191    Relevant Orders    Follow Up In Physical Therapy    Bulging lumbar disc M51.36    Relevant Orders    Follow Up In Physical Therapy    Lumbosacral strain S39.012A    Relevant Orders    Follow Up In Physical Therapy    Acute midline low back pain with left-sided sciatica M54.42    Relevant Orders    Follow Up In Physical Therapy    Coccygeal pain M53.3    Relevant Orders    Follow Up In Physical Therapy    Hamstring tightness M62.89    Relevant Orders    Follow Up In Physical Therapy    Sacroiliac joint somatic dysfunction M99.04    Relevant Orders    Follow Up In Physical Therapy    Radiculitis with lower extremity symptoms M54.10    Relevant Orders    Follow Up In Physical Therapy    Annular tear of lumbar disc M51.36    Relevant Orders    Follow Up In Physical Therapy    Lumbar disc herniation with radiculopathy M51.16    Relevant Orders    Follow Up In Physical Therapy         Subjective   General:  General  Reason for Referral: LBP  Referred By: Dr. Junior  Past Medical History Relevant to Rehab: 59 y/o F with h/o acute onset LBP since Sunday 4/21.  Was lifting heavy object and felt pop in back.  Saw MD yesterday.  Ordered PT, lumbar DONJOy brace and had injections.  Pain immensely approved today. No imaging at this time.    Patient reported hx of condition: Felt \"pop\" in back when lifting heavy vile of wine at home.       Surgery: "   No    Precautions:  Precautions  Precautions Comment: SAMANTHAWILLIE LSO    Relevant PMH:  H/O back pain    Red flags: No    Pain:  Pain Assessment: 0-10  Pain Score: 6  Pain Type: Acute pain  Pain Location: Back  Pain Orientation: Mid  Pain Radiating Towards: Posteiror thighs  Pain Descriptors: Aching  Home Living:  Home Living Comment: Independent    Prior Function Per Pt/Caregiver Report:  Level of [place]: Independent with ADLs and functional transfers, Independent with homemaking with ambulation  Ambulatory Assistance: Independent    Imaging:  No imaging at this time.  Pending symptom progression.     OBJECTIVE:  Objective   Posture:  Posture Comment: Flexed posturing, flexion bias  Range of Motion:  Range of Motion Comments: Limited lumbar all ranges, 50% lumbar flexion limitation, 75% extension, 50% trunk rotation.  Extension the most painful.  Strength:  Strength Comments: Impaired trunk/abdomen, impaired B/L hip flexion 3/5 due to back pain  Flexibility:  Flexibility Comment: Tight posterior chain hamstrings/gluteals  Palpation:  Palpation Comment: Tender lumbar paraspinals, gluteals, R SI  Special Tests:  Special Tests Comment: + neural tension test, + sacral spring, + R SI hypomobility with SFBT  Gait:  Gait Comment: Guarded  Balance:  Balance Comment: WNL  Stairs:  Stairs Comment: N/A  Bed Mobility:  Bed Mobility Comment: Increased time but independent with log roll  Transfers:  Transfers Comment: Independent  Other:  Comment: Limited standing endurance.  Lumbar Myotomes   Impaired L1-L5  Specific Lower Extremity MMT  R Iliopsoas: (5/5): 3/5  L Iliopsoas: (5/5): 3/5  DTR   Not assessed  Dermatomes  Dermatomes WFL: yes  Special Tests  Supine SLR: (Negative): +  Slump: (Negative): +  Maxime: (Negative): +  Ernie's: (Negative): -       Outcome Measures:  Other Measures  Oswestry Disablity Index (BHAVIK): 52% impaired     Assessment  PT Assessment Results: Decreased strength, Decreased range of motion, Decreased  endurance, Impaired balance, Decreased mobility, Pain  Rehab Prognosis: Good  Evaluation/Treatment Tolerance: Patient limited by fatigue, Patient limited by pain  Assessment Comment: Impaired functional mobility/activity tolerance    Pt is a 58 y.o. female who presents with impairments of LBP, B/L LE thigh pain (posterior). These impairments have led to functional limitations including impaired mobility and endurance. Pt would benefit from skilled physical therapy intervention to improve above impairments and facilitate return to function.    Plan  Treatment/Interventions: Aquatic therapy, Blood flow restriction therapy, Cryotherapy, Dry needling, Education/ Instruction, Electrical stimulation, Gait training, Manual therapy, Mechanical traction, Neuromuscular re-education, Taping techniques, Therapeutic activities, Therapeutic exercises, Ultrasound, Self care/ home management  PT Plan: Skilled PT  PT Frequency: 2 times per week  Onset Date: 04/23/24  Certification Period Start Date: 04/23/24  Number of Treatments Authorized: 20 visits  Rehab Potential: Good  Plan of Care Agreement: Patient    Plan for next visit: Continued traction, pain control as needed (manual), develop strengthening HEP as tolerated.     OP EDUCATION:  Outpatient Education  Individual(s) Educated: Patient  Education Provided: Anatomy, POC  Diagnosis and Precautions: LBP  Risk and Benefits Discussed with Patient/Caregiver/Other: yes  Patient/Caregiver Demonstrated Understanding: yes  Plan of Care Discussed and Agreed Upon: yes  Patient Response to Education: Patient/Caregiver Verbalized Understanding of Information    Today's Treatment:  Mechanical Traction  Initiated today for lumbar decompression:  60/20 on/off   50/25 pull x 15 minutes  Patient tolerates with no symptoms.   HEP to be completed daily, exercises include:  ISO ABD BID 3 sec hold 3x 10    Goals:  Active       Outpatient PT goals       Short Term Goals       Start:  04/23/24     Expected End:  06/07/24       STG 1: Patient will be independent with core strengthening HEP x 5 visits  STG 2: Patient will report LBP < 6/10 for improved activity tolerance and ability to participate in PT.          Long Term Goals       Start:  04/23/24    Expected End:  07/22/24       LTG 1: patient will report < 30% impairment on Oswestry functional scale  LTG 2: patient will report improved pain control with symptoms < 4/10 x 6 weeks from original onset of injury  LTG 3: patient will demonstrate pain full trunk AROM for improved overall flexibility  LTG 4: patient will demonstrate 5/5 B/L LE hip flexor strength  LTG 5: patient will demonstrate improved trunk strength as demonstrated by 10/10 bridges with good abd/pelvic control and no pain.

## 2024-04-25 ENCOUNTER — TREATMENT (OUTPATIENT)
Dept: PHYSICAL THERAPY | Facility: CLINIC | Age: 59
End: 2024-04-25
Payer: COMMERCIAL

## 2024-04-25 ENCOUNTER — CLINICAL SUPPORT (OUTPATIENT)
Dept: SPORTS MEDICINE | Facility: CLINIC | Age: 59
End: 2024-04-25
Payer: COMMERCIAL

## 2024-04-25 DIAGNOSIS — M51.16 LUMBAR DISC HERNIATION WITH RADICULOPATHY: ICD-10-CM

## 2024-04-25 DIAGNOSIS — M54.10 RADICULITIS WITH LOWER EXTREMITY SYMPTOMS: ICD-10-CM

## 2024-04-25 DIAGNOSIS — M62.89 HAMSTRING TIGHTNESS: ICD-10-CM

## 2024-04-25 DIAGNOSIS — M21.70 LEG LENGTH DISCREPANCY: ICD-10-CM

## 2024-04-25 DIAGNOSIS — G96.191 TARLOV CYST: ICD-10-CM

## 2024-04-25 DIAGNOSIS — M54.42 ACUTE MIDLINE LOW BACK PAIN WITH LEFT-SIDED SCIATICA: ICD-10-CM

## 2024-04-25 DIAGNOSIS — M48.00 CENTRAL STENOSIS OF SPINAL CANAL: ICD-10-CM

## 2024-04-25 DIAGNOSIS — M53.3 COCCYGEAL PAIN: ICD-10-CM

## 2024-04-25 DIAGNOSIS — M51.36 ANNULAR TEAR OF LUMBAR DISC: ICD-10-CM

## 2024-04-25 DIAGNOSIS — G96.191 TARLOV CYSTS: ICD-10-CM

## 2024-04-25 DIAGNOSIS — M21.079 VALGUS FOOT, UNSPECIFIED LATERALITY: ICD-10-CM

## 2024-04-25 DIAGNOSIS — M99.04 SACROILIAC JOINT SOMATIC DYSFUNCTION: ICD-10-CM

## 2024-04-25 DIAGNOSIS — M48.07 NEURAL FORAMINAL STENOSIS OF LUMBOSACRAL SPINE: ICD-10-CM

## 2024-04-25 DIAGNOSIS — S33.2XXD DISLOCATION OF COCCYX, SUBSEQUENT ENCOUNTER: ICD-10-CM

## 2024-04-25 DIAGNOSIS — M24.559 HIP FLEXOR TIGHTNESS, UNSPECIFIED LATERALITY: ICD-10-CM

## 2024-04-25 DIAGNOSIS — S39.012D LUMBOSACRAL STRAIN, SUBSEQUENT ENCOUNTER: ICD-10-CM

## 2024-04-25 DIAGNOSIS — M51.36 BULGING LUMBAR DISC: ICD-10-CM

## 2024-04-25 PROCEDURE — 96372 THER/PROPH/DIAG INJ SC/IM: CPT | Performed by: FAMILY MEDICINE

## 2024-04-25 PROCEDURE — 97012 MECHANICAL TRACTION THERAPY: CPT | Mod: GP

## 2024-04-25 PROCEDURE — 2500000004 HC RX 250 GENERAL PHARMACY W/ HCPCS (ALT 636 FOR OP/ED): Performed by: FAMILY MEDICINE

## 2024-04-25 PROCEDURE — 97140 MANUAL THERAPY 1/> REGIONS: CPT | Mod: GP

## 2024-04-25 RX ORDER — METHYLPREDNISOLONE ACETATE 40 MG/ML
40 INJECTION, SUSPENSION INTRA-ARTICULAR; INTRALESIONAL; INTRAMUSCULAR; SOFT TISSUE ONCE
Status: COMPLETED | OUTPATIENT
Start: 2024-04-25 | End: 2024-04-25

## 2024-04-25 RX ORDER — CYCLOBENZAPRINE HCL 10 MG
10 TABLET ORAL NIGHTLY PRN
Qty: 30 TABLET | Refills: 1 | Status: SHIPPED | OUTPATIENT
Start: 2024-04-25 | End: 2024-06-24

## 2024-04-25 RX ADMIN — METHYLPREDNISOLONE ACETATE 40 MG: 40 INJECTION, SUSPENSION INTRA-ARTICULAR; INTRALESIONAL; INTRAMUSCULAR; SOFT TISSUE at 11:51

## 2024-04-25 RX ADMIN — METHYLPREDNISOLONE SODIUM SUCCINATE 125 MG: 125 INJECTION, POWDER, FOR SOLUTION INTRAMUSCULAR; INTRAVENOUS at 11:50

## 2024-04-25 NOTE — TELEPHONE ENCOUNTER
Patient presented for PT stating that she is out of flexeril and requesting a prescription. Per Dr. Junior order placed.

## 2024-04-25 NOTE — PROGRESS NOTES
Patient discussed with Dr. Junior additional injection of Solumedrol and Toradol when she was in the building for physical therapy. Administered both injections and observed the patient for reaction for 15 minutes. The patient tolerated the procedure well with no adverse effects.

## 2024-04-25 NOTE — PROGRESS NOTES
"    Physical Therapy Treatment    Patient Name: Nelda Parra  MRN: [id number]  Encounter date:  4/25/2024  Time Calculation  Start Time: 1040  Stop Time: 1120  Time Calculation (min): 40 min  PT Modalities Time Entry  Mechanical Traction Time Entry: 15  PT Therapeutic Procedures Time Entry  Manual Therapy Time Entry: 15    Visit Number:  2 (including evaluation)  Planned total visits: 10 then re-assess  Visits Authorized/Insurance Coverage:  Need to confirm    Current Problem  Problem List Items Addressed This Visit             ICD-10-CM    Dislocation of coccyx S33.2XXA    Hip flexor tightness M24.559    Tarlov cyst G96.191    Bulging lumbar disc M51.36    Lumbosacral strain S39.012A    Acute midline low back pain with left-sided sciatica M54.42    Coccygeal pain M53.3    Hamstring tightness M62.89    Sacroiliac joint somatic dysfunction M99.04    Radiculitis with lower extremity symptoms M54.10    Annular tear of lumbar disc M51.36    Lumbar disc herniation with radiculopathy M51.16     Precautions   Low back pain considerations    Pain   3/10    Subjective  First full follow up today.  Still sore and tight.  Denies acute pain.  Soreness reported posterior femoral region.  No paraesthesias at rest in sitting/standing, some down LLE in prone.     Objective  Tender lumbar paraspinals    Treatment:  Mechanical Traction for Lumbar Decompression  60/20 pull x 15 minutes at 70/35    Manual/STM for pain control: x 15 minutes  In prone with pillow under chest/abdomen    Current HEP:  Postural correction and isometric abdominal activation    Activity tolerance:  Good tolerance to treatment    OP EDUCATION:   Reviewed benefits of soft tissue massage and mechanical traction    Assessment: Traction and manual for pain control and lower spinal decompression.  Patient tolerates.  Some stiffness after getting off table but no increased pain.      Pain end of session: 2-3 : \"I feel looser\"    Plan:     Continue with current " POC/no changes    Assessment of current progress against goals:  Insufficient treatment time to assess progress    Goals:  Active       Outpatient PT goals       Short Term Goals       Start:  04/23/24    Expected End:  06/07/24       STG 1: Patient will be independent with core strengthening HEP x 5 visits  STG 2: Patient will report LBP < 6/10 for improved activity tolerance and ability to participate in PT.          Long Term Goals       Start:  04/23/24    Expected End:  07/22/24       LTG 1: patient will report < 30% impairment on Oswestry functional scale  LTG 2: patient will report improved pain control with symptoms < 4/10 x 6 weeks from original onset of injury  LTG 3: patient will demonstrate pain full trunk AROM for improved overall flexibility  LTG 4: patient will demonstrate 5/5 B/L LE hip flexor strength  LTG 5: patient will demonstrate improved trunk strength as demonstrated by 10/10 bridges with good abd/pelvic control and no pain.

## 2024-05-02 ENCOUNTER — TREATMENT (OUTPATIENT)
Dept: PHYSICAL THERAPY | Facility: CLINIC | Age: 59
End: 2024-05-02
Payer: COMMERCIAL

## 2024-05-02 DIAGNOSIS — M51.16 LUMBAR DISC HERNIATION WITH RADICULOPATHY: ICD-10-CM

## 2024-05-02 DIAGNOSIS — M54.42 ACUTE MIDLINE LOW BACK PAIN WITH LEFT-SIDED SCIATICA: ICD-10-CM

## 2024-05-02 DIAGNOSIS — M54.10 RADICULITIS WITH LOWER EXTREMITY SYMPTOMS: ICD-10-CM

## 2024-05-02 DIAGNOSIS — S39.012D LUMBOSACRAL STRAIN, SUBSEQUENT ENCOUNTER: ICD-10-CM

## 2024-05-02 DIAGNOSIS — M62.89 HAMSTRING TIGHTNESS: ICD-10-CM

## 2024-05-02 DIAGNOSIS — M53.3 COCCYGEAL PAIN: ICD-10-CM

## 2024-05-02 DIAGNOSIS — G96.191 TARLOV CYST: ICD-10-CM

## 2024-05-02 DIAGNOSIS — M51.36 ANNULAR TEAR OF LUMBAR DISC: ICD-10-CM

## 2024-05-02 DIAGNOSIS — S33.2XXD DISLOCATION OF COCCYX, SUBSEQUENT ENCOUNTER: ICD-10-CM

## 2024-05-02 DIAGNOSIS — M51.36 BULGING LUMBAR DISC: ICD-10-CM

## 2024-05-02 DIAGNOSIS — M24.559 HIP FLEXOR TIGHTNESS, UNSPECIFIED LATERALITY: ICD-10-CM

## 2024-05-02 DIAGNOSIS — M99.04 SACROILIAC JOINT SOMATIC DYSFUNCTION: ICD-10-CM

## 2024-05-02 PROCEDURE — 97140 MANUAL THERAPY 1/> REGIONS: CPT | Mod: GP

## 2024-05-02 PROCEDURE — 97012 MECHANICAL TRACTION THERAPY: CPT | Mod: GP

## 2024-05-02 NOTE — PROGRESS NOTES
Physical Therapy Treatment     Patient Name: Nelda Parra  MRN: [id number]  Encounter date:  5/2/2024  Time Calculation  Start Time: 0655  Stop Time: 0735  Time Calculation (min): 40 min  PT Modalities Time Entry  Mechanical Traction Time Entry:   PT Therapeutic Procedures Time Entry  Manual Therapy Time Entry:      Visit Number:  3 (including evaluation)  Planned total visits: 10 then re-assess  Visits Authorized/Insurance Coverage:  Need to confirm     Current Problem  Problem List Items Addressed This Visit               ICD-10-CM     Dislocation of coccyx S33.2XXA     Hip flexor tightness M24.559     Tarlov cyst G96.191     Bulging lumbar disc M51.36     Lumbosacral strain S39.012A     Acute midline low back pain with left-sided sciatica M54.42     Coccygeal pain M53.3     Hamstring tightness M62.89     Sacroiliac joint somatic dysfunction M99.04     Radiculitis with lower extremity symptoms M54.10     Annular tear of lumbar disc M51.36     Lumbar disc herniation with radiculopathy M51.16      Precautions   Low back pain considerations     Pain   3-4/10     Subjective  Symptoms about the same.  Moderate low back discomfort.  No radicular symptoms down LE's     Objective  Tender lumbar paraspinals  + Spasm L ilioscostalis thoracis     Treatment:  Mechanical Traction for Lumbar Decompression  60/20 pull x 15 minutes at 80/40     Manual/STM for pain control: x 15 minutes  In prone with pillow under chest/abdomen     Current HEP:  Postural correction and isometric abdominal activation     Activity tolerance:  Good tolerance to treatment     OP EDUCATION:   None today     Assessment: Slow symptomatic progress.  Still has moderate back pain but with limited radicular pain.  No more centralized.  Continued manual for ms spasm lumbo-thoracic paraspinals.     Pain end of session: 2-3     Plan:  Continue with current POC/no changes     Assessment of current progress against goals:  Insufficient treatment time to  assess progress     Goals:  Active         Outpatient PT goals         Short Term Goals         Start:  04/23/24    Expected End:  06/07/24        STG 1: Patient will be independent with core strengthening HEP x 5 visits  STG 2: Patient will report LBP < 6/10 for improved activity tolerance and ability to participate in PT.            Long Term Goals         Start:  04/23/24    Expected End:  07/22/24        LTG 1: patient will report < 30% impairment on Oswestry functional scale  LTG 2: patient will report improved pain control with symptoms < 4/10 x 6 weeks from original onset of injury  LTG 3: patient will demonstrate pain full trunk AROM for improved overall flexibility  LTG 4: patient will demonstrate 5/5 B/L LE hip flexor strength  LTG 5: patient will demonstrate improved trunk strength as demonstrated by 10/10 bridges with good abd/pelvic control and no pain.

## 2024-05-07 ENCOUNTER — HOSPITAL ENCOUNTER (OUTPATIENT)
Dept: RADIOLOGY | Facility: CLINIC | Age: 59
Discharge: HOME | End: 2024-05-07
Payer: COMMERCIAL

## 2024-05-07 DIAGNOSIS — G96.191 TARLOV CYST: ICD-10-CM

## 2024-05-07 DIAGNOSIS — M24.559 HIP FLEXOR TIGHTNESS, UNSPECIFIED LATERALITY: ICD-10-CM

## 2024-05-07 DIAGNOSIS — M99.04 SACROILIAC JOINT SOMATIC DYSFUNCTION: ICD-10-CM

## 2024-05-07 DIAGNOSIS — M53.3 COCCYGEAL PAIN: ICD-10-CM

## 2024-05-07 DIAGNOSIS — S39.012D LUMBOSACRAL STRAIN, SUBSEQUENT ENCOUNTER: ICD-10-CM

## 2024-05-07 DIAGNOSIS — M51.36 BULGING LUMBAR DISC: ICD-10-CM

## 2024-05-07 DIAGNOSIS — M54.42 ACUTE MIDLINE LOW BACK PAIN WITH LEFT-SIDED SCIATICA: ICD-10-CM

## 2024-05-07 DIAGNOSIS — M62.89 HAMSTRING TIGHTNESS: ICD-10-CM

## 2024-05-07 DIAGNOSIS — M51.36 ANNULAR TEAR OF LUMBAR DISC: ICD-10-CM

## 2024-05-07 DIAGNOSIS — M54.10 RADICULITIS WITH LOWER EXTREMITY SYMPTOMS: ICD-10-CM

## 2024-05-07 DIAGNOSIS — M51.16 LUMBAR DISC HERNIATION WITH RADICULOPATHY: ICD-10-CM

## 2024-05-07 DIAGNOSIS — S33.2XXD DISLOCATION OF COCCYX, SUBSEQUENT ENCOUNTER: ICD-10-CM

## 2024-05-07 PROCEDURE — 72148 MRI LUMBAR SPINE W/O DYE: CPT | Performed by: RADIOLOGY

## 2024-05-07 PROCEDURE — 72148 MRI LUMBAR SPINE W/O DYE: CPT

## 2024-05-13 ENCOUNTER — TELEPHONE (OUTPATIENT)
Dept: SPORTS MEDICINE | Facility: CLINIC | Age: 59
End: 2024-05-13

## 2024-05-13 ENCOUNTER — DOCUMENTATION (OUTPATIENT)
Dept: PHYSICAL THERAPY | Facility: CLINIC | Age: 59
End: 2024-05-13
Payer: COMMERCIAL

## 2024-05-13 NOTE — PROGRESS NOTES
Physical Therapy                 Therapy Communication Note    Patient Name: Nelda Parra  MRN: [id number]  Today's Date: 5/13/2024     Discipline: Physical Therapy    Missed Visit Reason:      Missed Time: No Show    Comment:

## 2024-05-13 NOTE — TELEPHONE ENCOUNTER
Patient left  on Friday requesting a note for work. I left return VM on Friday and again today asking patient what she would like the note to say.   Asked her to call and leave a detailed message if she still needs a note and we can then ask Dr. Junior to sign a work note for her.

## 2024-05-16 ENCOUNTER — TREATMENT (OUTPATIENT)
Dept: PHYSICAL THERAPY | Facility: CLINIC | Age: 59
End: 2024-05-16
Payer: COMMERCIAL

## 2024-05-16 DIAGNOSIS — S33.2XXD DISLOCATION OF COCCYX, SUBSEQUENT ENCOUNTER: ICD-10-CM

## 2024-05-16 DIAGNOSIS — M54.42 ACUTE MIDLINE LOW BACK PAIN WITH LEFT-SIDED SCIATICA: ICD-10-CM

## 2024-05-16 DIAGNOSIS — M51.36 ANNULAR TEAR OF LUMBAR DISC: ICD-10-CM

## 2024-05-16 DIAGNOSIS — M54.10 RADICULITIS WITH LOWER EXTREMITY SYMPTOMS: ICD-10-CM

## 2024-05-16 DIAGNOSIS — M51.36 BULGING LUMBAR DISC: ICD-10-CM

## 2024-05-16 DIAGNOSIS — M62.89 HAMSTRING TIGHTNESS: ICD-10-CM

## 2024-05-16 DIAGNOSIS — M51.16 LUMBAR DISC HERNIATION WITH RADICULOPATHY: ICD-10-CM

## 2024-05-16 DIAGNOSIS — M99.04 SACROILIAC JOINT SOMATIC DYSFUNCTION: ICD-10-CM

## 2024-05-16 DIAGNOSIS — S39.012D LUMBOSACRAL STRAIN, SUBSEQUENT ENCOUNTER: ICD-10-CM

## 2024-05-16 DIAGNOSIS — M24.559 HIP FLEXOR TIGHTNESS, UNSPECIFIED LATERALITY: ICD-10-CM

## 2024-05-16 DIAGNOSIS — M53.3 COCCYGEAL PAIN: ICD-10-CM

## 2024-05-16 DIAGNOSIS — G96.191 TARLOV CYST: ICD-10-CM

## 2024-05-16 PROCEDURE — 97012 MECHANICAL TRACTION THERAPY: CPT | Mod: GP

## 2024-05-16 PROCEDURE — 97110 THERAPEUTIC EXERCISES: CPT | Mod: GP

## 2024-05-16 NOTE — PROGRESS NOTES
Physical Therapy Treatment     Patient Name: Nelda Parra  MRN: [id number]  Encounter date:  5/16/2024  Time Calculation  Start Time: 0700  Stop Time: 0733  Pt needing to leave early for work  Time Calculation (min): 33 min  PT Modalities Time Entry  Mechanical Traction Time Entry: 15  PT Therapeutic Procedures Time Entry  TE x 15 min     Visit Number:  4 (including evaluation)  Planned total visits: 10 then re-assess  Visits Authorized/Insurance Coverage:  Need to confirm     Current Problem  Problem List Items Addressed This Visit               ICD-10-CM     Dislocation of coccyx S33.2XXA     Hip flexor tightness M24.559     Tarlov cyst G96.191     Bulging lumbar disc M51.36     Lumbosacral strain S39.012A     Acute midline low back pain with left-sided sciatica M54.42     Coccygeal pain M53.3     Hamstring tightness M62.89     Sacroiliac joint somatic dysfunction M99.04     Radiculitis with lower extremity symptoms M54.10     Annular tear of lumbar disc M51.36     Lumbar disc herniation with radiculopathy M51.16      Precautions   Low back pain considerations     Pain  2     Subjective  Still having varying back and LE pain.  MRI completed. Revels facet hypertrophy, some stenosis and multi level disc bulge    Objective  Tender lumbar para-spinals     Treatment:  Mechanical Traction for Lumbar Decompression  60/20 pull x 15 minutes at 60/20 intermittent cycle  Some burning in LE elevated position    Edna extension for disc correction  Prone x 1 min  POF x 10 sec  Prone press x 10 sec  X 5 round     Current HEP:  Postural correction and isometric abdominal activation     Activity tolerance:  Good tolerance to treatment     OP EDUCATION:   None today     Assessment: Variable, inconsistent pain control.  Slow progress thus far.      Pain end of session: 3-4     Plan:  Continue with current POC/no changes     Assessment of current progress against goals:  Insufficient treatment time to assess progress      Goals:  Active         Outpatient PT goals         Short Term Goals         Start:  04/23/24    Expected End:  06/07/24        STG 1: Patient will be independent with core strengthening HEP x 5 visits  STG 2: Patient will report LBP < 6/10 for improved activity tolerance and ability to participate in PT.            Long Term Goals         Start:  04/23/24    Expected End:  07/22/24        LTG 1: patient will report < 30% impairment on Oswestry functional scale  LTG 2: patient will report improved pain control with symptoms < 4/10 x 6 weeks from original onset of injury  LTG 3: patient will demonstrate pain full trunk AROM for improved overall flexibility  LTG 4: patient will demonstrate 5/5 B/L LE hip flexor strength  LTG 5: patient will demonstrate improved trunk strength as demonstrated by 10/10 bridges with good abd/pelvic control and no pain.

## 2024-05-17 ENCOUNTER — PATIENT MESSAGE (OUTPATIENT)
Dept: SPORTS MEDICINE | Facility: CLINIC | Age: 59
End: 2024-05-17
Payer: COMMERCIAL

## 2024-05-20 ENCOUNTER — TREATMENT (OUTPATIENT)
Dept: PHYSICAL THERAPY | Facility: CLINIC | Age: 59
End: 2024-05-20
Payer: COMMERCIAL

## 2024-05-20 DIAGNOSIS — M62.89 HAMSTRING TIGHTNESS: ICD-10-CM

## 2024-05-20 DIAGNOSIS — M51.36 ANNULAR TEAR OF LUMBAR DISC: ICD-10-CM

## 2024-05-20 DIAGNOSIS — S33.2XXD DISLOCATION OF COCCYX, SUBSEQUENT ENCOUNTER: ICD-10-CM

## 2024-05-20 DIAGNOSIS — S39.012D LUMBOSACRAL STRAIN, SUBSEQUENT ENCOUNTER: ICD-10-CM

## 2024-05-20 DIAGNOSIS — G96.191 TARLOV CYST: ICD-10-CM

## 2024-05-20 DIAGNOSIS — M54.10 RADICULITIS WITH LOWER EXTREMITY SYMPTOMS: ICD-10-CM

## 2024-05-20 DIAGNOSIS — M53.3 COCCYGEAL PAIN: ICD-10-CM

## 2024-05-20 DIAGNOSIS — M99.04 SACROILIAC JOINT SOMATIC DYSFUNCTION: ICD-10-CM

## 2024-05-20 DIAGNOSIS — M54.42 ACUTE MIDLINE LOW BACK PAIN WITH LEFT-SIDED SCIATICA: ICD-10-CM

## 2024-05-20 DIAGNOSIS — M24.559 HIP FLEXOR TIGHTNESS, UNSPECIFIED LATERALITY: ICD-10-CM

## 2024-05-20 DIAGNOSIS — M51.36 BULGING LUMBAR DISC: ICD-10-CM

## 2024-05-20 DIAGNOSIS — M51.16 LUMBAR DISC HERNIATION WITH RADICULOPATHY: ICD-10-CM

## 2024-05-20 PROCEDURE — 97012 MECHANICAL TRACTION THERAPY: CPT | Mod: GP

## 2024-05-20 NOTE — PROGRESS NOTES
Physical Therapy Treatment     Patient Name: Nelda Parra  MRN: [id number]  Encounter date:  5/20/2024  Time Calculation  Start Time: 0705  Stop Time: 0735    Time Calculation (min): 30  PT Modalities Time Entry  Mechanical Traction Time Entry: 15    Visit Number:  5 (including evaluation)  Planned total visits: 10 then re-assess  Visits Authorized/Insurance Coverage:  Need to confirm     Current Problem  Problem List Items Addressed This Visit               ICD-10-CM     Dislocation of coccyx S33.2XXA     Hip flexor tightness M24.559     Tarlov cyst G96.191     Bulging lumbar disc M51.36     Lumbosacral strain S39.012A     Acute midline low back pain with left-sided sciatica M54.42     Coccygeal pain M53.3     Hamstring tightness M62.89     Sacroiliac joint somatic dysfunction M99.04     Radiculitis with lower extremity symptoms M54.10     Annular tear of lumbar disc M51.36     Lumbar disc herniation with radiculopathy M51.16      Precautions   Low back pain considerations     Pain  1-2 back ache     Subjective  Back was aching after a busy weekend.  Denies radicular symptoms.  Patient states she did not do home prone exercise.      Objective  Trunk AROM WFL    Treatment:  Mechanical Traction for Lumbar Decompression in prone today.   60/20 pull x 15 minutes at 60/20 intermittent cycle  Some burning in LE elevated position     Edna extension for disc correction  Prone x 1 min  POF x 10 sec  Prone press x 10 sec  X 5 round     Current HEP:  Postural correction and isometric abdominal activation     Activity tolerance:  Good tolerance to treatment     OP EDUCATION:   None today     Assessment: Tolerates prone traction trial today. Patient feels it is a more tolerable position the HL with support under legs. Will continue prone traction and emphasize prone ext ex at home.      Pain end of session: Unchanged     Plan:  Continue with current POC/no changes     Assessment of current progress against  goals:  Insufficient treatment time to assess progress     Goals:  Active         Outpatient PT goals         Short Term Goals         Start:  04/23/24    Expected End:  06/07/24        STG 1: Patient will be independent with core strengthening HEP x 5 visits  STG 2: Patient will report LBP < 6/10 for improved activity tolerance and ability to participate in PT.            Long Term Goals         Start:  04/23/24    Expected End:  07/22/24        LTG 1: patient will report < 30% impairment on Oswestry functional scale  LTG 2: patient will report improved pain control with symptoms < 4/10 x 6 weeks from original onset of injury  LTG 3: patient will demonstrate pain full trunk AROM for improved overall flexibility  LTG 4: patient will demonstrate 5/5 B/L LE hip flexor strength  LTG 5: patient will demonstrate improved trunk strength as demonstrated by 10/10 bridges with good abd/pelvic control and no pain.

## 2024-05-23 ENCOUNTER — TREATMENT (OUTPATIENT)
Dept: PHYSICAL THERAPY | Facility: CLINIC | Age: 59
End: 2024-05-23
Payer: COMMERCIAL

## 2024-05-23 DIAGNOSIS — M54.42 ACUTE MIDLINE LOW BACK PAIN WITH LEFT-SIDED SCIATICA: ICD-10-CM

## 2024-05-23 DIAGNOSIS — M54.10 RADICULITIS WITH LOWER EXTREMITY SYMPTOMS: ICD-10-CM

## 2024-05-23 DIAGNOSIS — M51.36 BULGING LUMBAR DISC: ICD-10-CM

## 2024-05-23 DIAGNOSIS — M51.36 ANNULAR TEAR OF LUMBAR DISC: ICD-10-CM

## 2024-05-23 DIAGNOSIS — G96.191 TARLOV CYST: ICD-10-CM

## 2024-05-23 DIAGNOSIS — M99.04 SACROILIAC JOINT SOMATIC DYSFUNCTION: ICD-10-CM

## 2024-05-23 DIAGNOSIS — M24.559 HIP FLEXOR TIGHTNESS, UNSPECIFIED LATERALITY: ICD-10-CM

## 2024-05-23 DIAGNOSIS — S33.2XXD DISLOCATION OF COCCYX, SUBSEQUENT ENCOUNTER: ICD-10-CM

## 2024-05-23 DIAGNOSIS — M62.89 HAMSTRING TIGHTNESS: ICD-10-CM

## 2024-05-23 DIAGNOSIS — S39.012D LUMBOSACRAL STRAIN, SUBSEQUENT ENCOUNTER: ICD-10-CM

## 2024-05-23 DIAGNOSIS — M53.3 COCCYGEAL PAIN: ICD-10-CM

## 2024-05-23 DIAGNOSIS — M51.16 LUMBAR DISC HERNIATION WITH RADICULOPATHY: ICD-10-CM

## 2024-05-23 PROCEDURE — 97012 MECHANICAL TRACTION THERAPY: CPT | Mod: GP

## 2024-05-23 NOTE — PROGRESS NOTES
Physical Therapy Treatment     Patient Name: Nelda Parra  MRN: [id number]  Encounter date:  5/23/2024  Time Calculation  Start Time: 0705  Stop Time: 0735     Time Calculation (min): 35  PT Modalities Time Entry  Mechanical Traction Time Entry: 15     Visit Number:  6 (including evaluation)  Planned total visits: 10 then re-assess  Visits Authorized/Insurance Coverage:  Need to confirm     Current Problem  Problem List Items Addressed This Visit               ICD-10-CM     Dislocation of coccyx S33.2XXA     Hip flexor tightness M24.559     Tarlov cyst G96.191     Bulging lumbar disc M51.36     Lumbosacral strain S39.012A     Acute midline low back pain with left-sided sciatica M54.42     Coccygeal pain M53.3     Hamstring tightness M62.89     Sacroiliac joint somatic dysfunction M99.04     Radiculitis with lower extremity symptoms M54.10     Annular tear of lumbar disc M51.36     Lumbar disc herniation with radiculopathy M51.16      Precautions   Low back pain considerations     Pain  3     Subjective  Denies major exacerbations. Tolerated prone traction last session. Wishes to continue     Objective  Point tenderness B/L lumbar parapsinals.     Treatment:  Mechanical Traction for Lumbar Decompression in prone today.   80/40  pull x 15 minutes at 60/20 intermittent cycle  Some burning in LE elevated position     No TE today     Current HEP:  Postural correction and isometric abdominal activation     Activity tolerance:  Good tolerance to treatment     OP EDUCATION:   None today     Assessment:  Fair/good pain control.  Still intermittently present but less acute compared to when patient initially presented to PT.      Pain end of session: 1     Plan:  Continue with current POC/no changes     Assessment of current progress against goals:  Insufficient treatment time to assess progress     Goals:  Active         Outpatient PT goals         Short Term Goals         Start:  04/23/24    Expected End:  06/07/24         STG 1: Patient will be independent with core strengthening HEP x 5 visits  STG 2: Patient will report LBP < 6/10 for improved activity tolerance and ability to participate in PT.            Long Term Goals         Start:  04/23/24    Expected End:  07/22/24        LTG 1: patient will report < 30% impairment on Oswestry functional scale  LTG 2: patient will report improved pain control with symptoms < 4/10 x 6 weeks from original onset of injury  LTG 3: patient will demonstrate pain full trunk AROM for improved overall flexibility  LTG 4: patient will demonstrate 5/5 B/L LE hip flexor strength  LTG 5: patient will demonstrate improved trunk strength as demonstrated by 10/10 bridges with good abd/pelvic control and no pain.

## 2024-05-29 ENCOUNTER — TREATMENT (OUTPATIENT)
Dept: PHYSICAL THERAPY | Facility: CLINIC | Age: 59
End: 2024-05-29
Payer: COMMERCIAL

## 2024-05-29 DIAGNOSIS — M51.36 BULGING LUMBAR DISC: ICD-10-CM

## 2024-05-29 DIAGNOSIS — M51.16 LUMBAR DISC HERNIATION WITH RADICULOPATHY: ICD-10-CM

## 2024-05-29 DIAGNOSIS — M24.559 HIP FLEXOR TIGHTNESS, UNSPECIFIED LATERALITY: ICD-10-CM

## 2024-05-29 DIAGNOSIS — S39.012D LUMBOSACRAL STRAIN, SUBSEQUENT ENCOUNTER: ICD-10-CM

## 2024-05-29 DIAGNOSIS — M51.36 ANNULAR TEAR OF LUMBAR DISC: ICD-10-CM

## 2024-05-29 DIAGNOSIS — G96.191 TARLOV CYST: ICD-10-CM

## 2024-05-29 DIAGNOSIS — M54.42 ACUTE MIDLINE LOW BACK PAIN WITH LEFT-SIDED SCIATICA: ICD-10-CM

## 2024-05-29 DIAGNOSIS — M99.04 SACROILIAC JOINT SOMATIC DYSFUNCTION: ICD-10-CM

## 2024-05-29 DIAGNOSIS — M53.3 COCCYGEAL PAIN: ICD-10-CM

## 2024-05-29 DIAGNOSIS — M62.89 HAMSTRING TIGHTNESS: ICD-10-CM

## 2024-05-29 DIAGNOSIS — S33.2XXD DISLOCATION OF COCCYX, SUBSEQUENT ENCOUNTER: ICD-10-CM

## 2024-05-29 DIAGNOSIS — M54.10 RADICULITIS WITH LOWER EXTREMITY SYMPTOMS: ICD-10-CM

## 2024-05-29 PROCEDURE — 97012 MECHANICAL TRACTION THERAPY: CPT | Mod: GP

## 2024-05-29 NOTE — PROGRESS NOTES
"       Physical Therapy Treatment     Patient Name: Nelda Parra  MRN: [id number]  Encounter date:  5/29/2024  Time Calculation  Start Time: 0700  Stop Time: 0730     Time Calculation (min): 30  PT Modalities Time Entry  Mechanical Traction Time Entry: 20     Visit Number:  7 (including evaluation)  Planned total visits: 10 then re-assess  Visits Authorized/Insurance Coverage:  Need to confirm     Current Problem  Problem List Items Addressed This Visit               ICD-10-CM     Dislocation of coccyx S33.2XXA     Hip flexor tightness M24.559     Tarlov cyst G96.191     Bulging lumbar disc M51.36     Lumbosacral strain S39.012A     Acute midline low back pain with left-sided sciatica M54.42     Coccygeal pain M53.3     Hamstring tightness M62.89     Sacroiliac joint somatic dysfunction M99.04     Radiculitis with lower extremity symptoms M54.10     Annular tear of lumbar disc M51.36     Lumbar disc herniation with radiculopathy M51.16      Precautions   Low back pain considerations     Pain  1-2     Subjective  Had LBP and RLE radicular pain this weekend again after being active.  Current pain minimal     Objective  Gross trunk AROM WFL     Treatment:  Mechanical Traction for Lumbar Decompression in prone today.   70/40  pull x 15 minutes at 60/20 intermittent cycle  No radicular symptoms today.      Current HEP:  Postural correction and isometric abdominal activation, prone lying, POF, prone press-ups.  Independent with current HEP.      Activity tolerance:  Good tolerance to treatment     OP EDUCATION:   None today     Assessment:  Intensity/severity of pain remains controlled but still having intermittent \"flare-ups\" with increased activity at home.  Continue traction in an attempt to provide decompression and reduce severity of spinal/disc bulge symptoms.   Complaints do not appear to be musculature in nature.     Pain end of session: 1     Plan:  Continue with current POC/no changes     Assessment of current " progress against goals:  Insufficient treatment time to assess progress     Goals:  Active         Outpatient PT goals         Short Term Goals         Start:  04/23/24    Expected End:  06/07/24        STG 1: Patient will be independent with core strengthening HEP x 5 visits  STG 2: Patient will report LBP < 6/10 for improved activity tolerance and ability to participate in PT.            Long Term Goals         Start:  04/23/24    Expected End:  07/22/24        LTG 1: patient will report < 30% impairment on Oswestry functional scale  LTG 2: patient will report improved pain control with symptoms < 4/10 x 6 weeks from original onset of injury  LTG 3: patient will demonstrate pain full trunk AROM for improved overall flexibility  LTG 4: patient will demonstrate 5/5 B/L LE hip flexor strength  LTG 5: patient will demonstrate improved trunk strength as demonstrated by 10/10 bridges with good abd/pelvic control and no pain.

## 2024-05-31 ENCOUNTER — APPOINTMENT (OUTPATIENT)
Dept: PHYSICAL THERAPY | Facility: CLINIC | Age: 59
End: 2024-05-31
Payer: COMMERCIAL

## 2024-05-31 NOTE — PROGRESS NOTES
"       Physical Therapy Treatment     Patient Name: Nelda Parra  MRN: [id number]  Encounter date:  5/31/2024  Time Calculation  Start Time: 0700  Stop Time: 0730     Time Calculation (min): 30  PT Modalities Time Entry  Mechanical Traction Time Entry: 20     Visit Number:  8 (including evaluation)  Planned total visits: 10 then re-assess  Visits Authorized/Insurance Coverage:  Need to confirm     Current Problem  Problem List Items Addressed This Visit               ICD-10-CM     Dislocation of coccyx S33.2XXA     Hip flexor tightness M24.559     Tarlov cyst G96.191     Bulging lumbar disc M51.36     Lumbosacral strain S39.012A     Acute midline low back pain with left-sided sciatica M54.42     Coccygeal pain M53.3     Hamstring tightness M62.89     Sacroiliac joint somatic dysfunction M99.04     Radiculitis with lower extremity symptoms M54.10     Annular tear of lumbar disc M51.36     Lumbar disc herniation with radiculopathy M51.16      Precautions   Low back pain considerations     Pain  1-2     Subjective  Reports increased pain L lumbar and LLE radicular pain yesterday and today.  Unsure of cause. Denies falls.      Objective       Treatment:  Mechanical Traction for Lumbar Decompression in prone today.   70/40  pull x 15 minutes at 60/20 intermittent cycle  No radicular symptoms today.      Current HEP:  Postural correction and isometric abdominal activation, prone lying, POF, prone press-ups.  Independent with current HEP.      Activity tolerance:  Good tolerance to treatment     OP EDUCATION:   None today     Assessment:  Intensity/severity of pain remains controlled but still having intermittent \"flare-ups\" with increased activity at home.  Continue traction in an attempt to provide decompression and reduce severity of spinal/disc bulge symptoms.   Complaints do not appear to be musculature in nature.      Pain end of session: 1     Plan:  Continue with current POC/no changes     Assessment of current " progress against goals:  Insufficient treatment time to assess progress     Goals:  Active         Outpatient PT goals         Short Term Goals         Start:  04/23/24    Expected End:  06/07/24        STG 1: Patient will be independent with core strengthening HEP x 5 visits  STG 2: Patient will report LBP < 6/10 for improved activity tolerance and ability to participate in PT.            Long Term Goals         Start:  04/23/24    Expected End:  07/22/24        LTG 1: patient will report < 30% impairment on Oswestry functional scale  LTG 2: patient will report improved pain control with symptoms < 4/10 x 6 weeks from original onset of injury  LTG 3: patient will demonstrate pain full trunk AROM for improved overall flexibility  LTG 4: patient will demonstrate 5/5 B/L LE hip flexor strength  LTG 5: patient will demonstrate improved trunk strength as demonstrated by 10/10 bridges with good abd/pelvic control and no pain.

## 2024-06-04 ENCOUNTER — TREATMENT (OUTPATIENT)
Dept: PHYSICAL THERAPY | Facility: CLINIC | Age: 59
End: 2024-06-04
Payer: COMMERCIAL

## 2024-06-04 DIAGNOSIS — M54.10 RADICULITIS WITH LOWER EXTREMITY SYMPTOMS: ICD-10-CM

## 2024-06-04 DIAGNOSIS — M51.16 LUMBAR DISC HERNIATION WITH RADICULOPATHY: ICD-10-CM

## 2024-06-04 DIAGNOSIS — S33.2XXD DISLOCATION OF COCCYX, SUBSEQUENT ENCOUNTER: ICD-10-CM

## 2024-06-04 DIAGNOSIS — M24.559 HIP FLEXOR TIGHTNESS, UNSPECIFIED LATERALITY: ICD-10-CM

## 2024-06-04 DIAGNOSIS — M53.3 COCCYGEAL PAIN: ICD-10-CM

## 2024-06-04 DIAGNOSIS — S39.012D LUMBOSACRAL STRAIN, SUBSEQUENT ENCOUNTER: ICD-10-CM

## 2024-06-04 DIAGNOSIS — G96.191 TARLOV CYST: ICD-10-CM

## 2024-06-04 DIAGNOSIS — M99.04 SACROILIAC JOINT SOMATIC DYSFUNCTION: ICD-10-CM

## 2024-06-04 DIAGNOSIS — M54.42 ACUTE MIDLINE LOW BACK PAIN WITH LEFT-SIDED SCIATICA: ICD-10-CM

## 2024-06-04 DIAGNOSIS — M51.36 BULGING LUMBAR DISC: ICD-10-CM

## 2024-06-04 DIAGNOSIS — M62.89 HAMSTRING TIGHTNESS: ICD-10-CM

## 2024-06-04 DIAGNOSIS — M51.36 ANNULAR TEAR OF LUMBAR DISC: ICD-10-CM

## 2024-06-04 PROCEDURE — 97012 MECHANICAL TRACTION THERAPY: CPT | Mod: GP

## 2024-06-04 NOTE — PROGRESS NOTES
Physical Therapy Treatment     Patient Name: Nelda Parra  MRN: [id number]  Encounter date:  6/4/2024  Time Calculation  Start Time: 0930  Stop Time: 1000     Time Calculation (min): 30  PT Modalities Time Entry  Mechanical Traction Time Entry: 20     Visit Number:  8 (including evaluation)  Planned total visits: 10 then re-assess  Visits Authorized/Insurance Coverage:  Need to confirm     Current Problem  Problem List Items Addressed This Visit               ICD-10-CM     Dislocation of coccyx S33.2XXA     Hip flexor tightness M24.559     Tarlov cyst G96.191     Bulging lumbar disc M51.36     Lumbosacral strain S39.012A     Acute midline low back pain with left-sided sciatica M54.42     Coccygeal pain M53.3     Hamstring tightness M62.89     Sacroiliac joint somatic dysfunction M99.04     Radiculitis with lower extremity symptoms M54.10     Annular tear of lumbar disc M51.36     Lumbar disc herniation with radiculopathy M51.16      Precautions   Low back pain considerations     Pain  1     Subjective  Symptoms have improved since last week.  Mild discomfort tailbone.  No LE radicular symptoms.     Objective  Gait WNL     Treatment:  Mechanical Traction for Lumbar Decompression in prone today.   40/20 pull x 15 minutes at 60/20 intermittent cycle  No radicular symptoms today.      Current HEP:  Postural correction and isometric abdominal activation, prone lying, POF, prone press-ups.  Independent with current HEP.      Activity tolerance:  Good tolerance to treatment     OP EDUCATION:   None today     Assessment: Minimal pain since last week.  Continues to benefit from prone traction.  IND with prone extension exercise program.  Re-assess visit 10.      Pain end of session: 1-2     Plan:  Continue with current POC/no changes     Assessment of current progress against goals:  Insufficient treatment time to assess progress     Goals:  Active         Outpatient PT goals         Short Term Goals         Start:   04/23/24    Expected End:  06/07/24        STG 1: Patient will be independent with core strengthening HEP x 5 visits  STG 2: Patient will report LBP < 6/10 for improved activity tolerance and ability to participate in PT.            Long Term Goals         Start:  04/23/24    Expected End:  07/22/24        LTG 1: patient will report < 30% impairment on Oswestry functional scale  LTG 2: patient will report improved pain control with symptoms < 4/10 x 6 weeks from original onset of injury  LTG 3: patient will demonstrate pain full trunk AROM for improved overall flexibility  LTG 4: patient will demonstrate 5/5 B/L LE hip flexor strength  LTG 5: patient will demonstrate improved trunk strength as demonstrated by 10/10 bridges with good abd/pelvic control and no pain.

## 2024-06-06 ENCOUNTER — APPOINTMENT (OUTPATIENT)
Dept: PHYSICAL THERAPY | Facility: CLINIC | Age: 59
End: 2024-06-06
Payer: COMMERCIAL

## 2024-06-26 ENCOUNTER — APPOINTMENT (OUTPATIENT)
Dept: PHYSICAL THERAPY | Facility: CLINIC | Age: 59
End: 2024-06-26
Payer: COMMERCIAL

## 2024-07-03 ENCOUNTER — APPOINTMENT (OUTPATIENT)
Dept: PHYSICAL THERAPY | Facility: CLINIC | Age: 59
End: 2024-07-03
Payer: COMMERCIAL

## 2024-07-16 ENCOUNTER — HOSPITAL ENCOUNTER (OUTPATIENT)
Dept: RADIOLOGY | Facility: CLINIC | Age: 59
Discharge: HOME | End: 2024-07-16
Payer: COMMERCIAL

## 2024-07-16 ENCOUNTER — OFFICE VISIT (OUTPATIENT)
Dept: SPORTS MEDICINE | Facility: CLINIC | Age: 59
End: 2024-07-16
Payer: COMMERCIAL

## 2024-07-16 VITALS
HEART RATE: 64 BPM | SYSTOLIC BLOOD PRESSURE: 118 MMHG | HEIGHT: 59 IN | BODY MASS INDEX: 24.19 KG/M2 | WEIGHT: 120 LBS | DIASTOLIC BLOOD PRESSURE: 72 MMHG

## 2024-07-16 DIAGNOSIS — M25.571 PAIN IN JOINT OF RIGHT ANKLE: ICD-10-CM

## 2024-07-16 DIAGNOSIS — S93.401A SPRAIN OF LIGAMENT OF RIGHT ANKLE, INITIAL ENCOUNTER: ICD-10-CM

## 2024-07-16 DIAGNOSIS — S99.911A RIGHT ANKLE INJURY, INITIAL ENCOUNTER: ICD-10-CM

## 2024-07-16 DIAGNOSIS — S93.491A HIGH ANKLE SPRAIN, RIGHT, INITIAL ENCOUNTER: Primary | ICD-10-CM

## 2024-07-16 PROCEDURE — 99214 OFFICE O/P EST MOD 30 MIN: CPT | Mod: 25 | Performed by: NURSE PRACTITIONER

## 2024-07-16 PROCEDURE — 29505 APPLICATION LONG LEG SPLINT: CPT | Performed by: NURSE PRACTITIONER

## 2024-07-16 PROCEDURE — 73610 X-RAY EXAM OF ANKLE: CPT | Mod: RT

## 2024-07-16 PROCEDURE — 73610 X-RAY EXAM OF ANKLE: CPT | Mod: RIGHT SIDE | Performed by: RADIOLOGY

## 2024-07-16 ASSESSMENT — ENCOUNTER SYMPTOMS
LOSS OF SENSATION IN FEET: 0
ARTHRALGIAS: 1
OCCASIONAL FEELINGS OF UNSTEADINESS: 0
MYALGIAS: 1
CARDIOVASCULAR NEGATIVE: 1
DEPRESSION: 0
CONSTITUTIONAL NEGATIVE: 1
RESPIRATORY NEGATIVE: 1

## 2024-07-16 ASSESSMENT — PATIENT HEALTH QUESTIONNAIRE - PHQ9: 1. LITTLE INTEREST OR PLEASURE IN DOING THINGS: NOT AT ALL

## 2024-07-16 ASSESSMENT — PAIN SCALES - GENERAL: PAINLEVEL: 5

## 2024-07-16 NOTE — PATIENT INSTRUCTIONS
May use PRICE therapy as needed.  Start into Physical Therapy 1-2 times a week for 8-10 weeks with manual therapy as well as dry needling and IASTM  Again stressed the importance of wearing shoes with good stability control to help with the biomechanics affecting the lower legs  Again stressed the importance of wearing full foot insoles to help with the biomechanics affecting the lower legs  Recommendation over-the-counter calcium 500mg, 3 times a day with vitamin-D3 7543-4875+ units a day with food as well as a daily multivitamin  Recommendation over-the-counter Move Free for joint health  May take OTC Tylenol Extra Strength or OTC Tylenol Arthritis, taking one every 6-8 hours with food as needed for pain management.  Patient advised regarding the risk and/or potential adverse reactions and/or side effects of any prescribed medications along with any over-the-counter medications or any supplements used. Patient advised to seek immediate medical care if any adverse reactions occur. The patient and/or patient(s) parent(s) verbalized their understanding.  Discussed in detail with the patient to the level of their understanding the possibility in the future of regenerative injections versus corticosteroids injections  Possibility in future of MRI of RIGHT ANKLE to rule out tendon vs ligament vs tear vs fracture vs other, MSK to read.   Patient was given a TALL WALKING BOOT brace for their RIGHT ANKLE injury/condition. The patient is ambulatory with or without aid and feels more stable with the brace on. The brace definitely helps improve their function as well as stability. Verbal and written instructions for the use, wear schedule, cleaning, and application of this brace were given. Patient was instructed that should the brace result in increased pain, decreased sensation, numbness and/or tingling, increased swelling, or an overall worsening of their medical condition; to please contact our office immediately.  Orthotic/brace management and training was provided for skin care, modifications due to healing tissues, edema changes, interruption in skin integrity, and safety precautions with the Orthosis/brace.   Follow up for 2 weeks ([phone])

## 2024-07-16 NOTE — PROGRESS NOTES
"Established patient  History Of Present Illness  Nelda Parra is a 59 y.o. female presenting with RIGHT ankle pain. Pt states that last night while walking outside between her driveway and grass, her foot \"rolled\". Pain located on the top of the ankle in to the lower leg. She presents wearing an air splint that she previously had but wanted to wear for support. Slight swelling present. Pain with all range of motion. Denies any numbness or tingling. No previous history of injury that she can recall. Rates current pain as a 5/10 sitting and states that it can increase to a 10/10.  We reviewed patient x-ray results in detail.  No acute fractures or dislocations noted.  After discussion we will place patient into a tall walking boot and have her follow-up in 2 weeks for reevaluation with consideration of MRI or CT is indicated for possible high ankle sprain or occult fracture.  Patient verbalizes understanding and agreement with plan of care.    Past Medical History  She has no past medical history on file.    Surgical History  She has a past surgical history that includes MR arthrogram shoulder right w FL guided injection (Right, 12/18/2013) and MR angio head wo IV contrast (1/15/2020).     Social History  She reports that she has never smoked. She has never used smokeless tobacco. No history on file for alcohol use and drug use.    Family History  No family history on file.     Allergies  Nsaids (non-steroidal anti-inflammatory drug), Naproxen, and Sulfa (sulfonamide antibiotics)    Review of Systems  Review of Systems   Constitutional: Negative.    Respiratory: Negative.     Cardiovascular: Negative.    Musculoskeletal:  Positive for arthralgias and myalgias.     Last Recorded Vitals  /72 (BP Location: Right arm, Patient Position: Sitting, BP Cuff Size: Adult)   Pulse 64   Ht 1.499 m (4' 11\")   Wt 54.4 kg (120 lb)   BMI 24.24 kg/m²      The , IDA NUNES was present during today's visit and " not limited to physical examination!    Examination  Right Ankle  Edema: Positive  Ecchymosis/Bruising: Negative   Percussion Test: Positive   Tuning Fork Test: Positive     Orientation:    Positive Asymmetrical, because of  swelling.     ROM:   Positive, Decreased.           Muscle Strength:   Positive +4/+5 Planar Flexion, Decreased due to pain  Positive +4/+5 Dorsi Flexion, Decreased due to pain         Positive +3/+5 Inversion, Decreased due to pain  Positive +3/+5 Eversion, Decreased due to pain       Positive +4/+5 Plantarflexion in combination with inversion, Decreased due to pain  Positive +4/+5 Plantarflexion in combination with eversion, Decreased due to pain        Positive +4/+5 Dorsiflexion in combination with inversion (Posterior Tibialis), Decreased due to pain  Positive +4/+5 Dorsiflexion in combination with eversion (Peroneal Brevis), Decreased due to pain  Positive +4/+5 Dorsiflexion in combination with eversion and flexion of great toe (Peroneal Longus), Decreased due to pain.            Palpation:   Positive  Painful, Tender to Palpation over the anterior and posterior tib/fib ligaments, dome of the talus, navicular.            Vascular:   +2/+4 Dorsalis Pedis  +2/+4 Posterior Tibialis  Capillary Refill < 2 Seconds.     Leg/Ankle/Foot - Ankle Impingement:  Posterior Ankle Impingement Test: Positive   Anterior Ankle Impingement Test: Negative.            Leg/Ankle/Foot - Ankle Instability:  Flexibility Test:  Positive   Anterior Drawer Test:  Positive    Talar Tilt Test:  Negative  External Rotation Kleiger Plantar Flexion Test:  Positive   External Rotation Kleiger Dorsiflexion Test:  Positive   Squeeze Test:  Positive, Distal tibia and fibula.   Dorsiflexion Test:  Positive   Posterior Tibial Instability Test: Negative.  Peroneal Tendon Instability Test:  Positive  Horizontal Squeeze Test:  Positive  Vertical Squeeze Test:  Negative.   Plantarflexion:  Positive  Standing/Walking Test:  Positive,  Painful.   Tibial Torsion Test:  Negative       Leg/Ankle/Foot - DVT:  Tom's Sign: Negative.            Leg/Ankle/Foot - Forefoot:  Strunsky Test:  Negative.   Gaenslen Maneuver Test:  Negative.   Metatarsal Tap Test:  Negative.   Crepitation Test:  Negative.         Leg/Ankle/Foot - Hindfoot Achilles/Calcaneus:  Carroll Compression Test: Negative.   Hoffa Sign:  Negative.   Achilles Tendon Tap Test:  Negative.   Heel Compression Test:  Negative.   Heel Thump Test:  Positive        Feet/Foot:   Positive BILATERAL Valgus foot       Imaging and Diagnostics Review:  XRAYs ordered during today's visit    Assessment   1. High ankle sprain, right, initial encounter    2. Pain in joint of right ankle    3. Sprain of ligament of right ankle, initial encounter    4. Right ankle injury, initial encounter        Plan   Treatment or Intervention:  May use PRICE therapy as needed.  Start into Physical Therapy 1-2 times a week for 8-10 weeks with manual therapy as well as dry needling and IASTM  Again stressed the importance of wearing shoes with good stability control to help with the biomechanics affecting the lower legs  Again stressed the importance of wearing full foot insoles to help with the biomechanics affecting the lower legs  Recommendation over-the-counter calcium 500mg, 3 times a day with vitamin-D3 9623-2943+ units a day with food as well as a daily multivitamin  Recommendation over-the-counter Move Free for joint health  May take OTC Tylenol Extra Strength or OTC Tylenol Arthritis, taking one every 6-8 hours with food as needed for pain management.  Patient advised regarding the risk and/or potential adverse reactions and/or side effects of any prescribed medications along with any over-the-counter medications or any supplements used. Patient advised to seek immediate medical care if any adverse reactions occur. The patient and/or patient(s) parent(s) verbalized their understanding.  Discussed in detail with the  patient to the level of their understanding the possibility in the future of regenerative injections versus corticosteroids injections  Possibility in future of MRI of RIGHT ANKLE to rule out tendon vs ligament vs tear vs fracture vs other, MSK to read.   Patient was given a TALL WALKING BOOT brace for their RIGHT ANKLE injury/condition. The patient is ambulatory with or without aid and feels more stable with the brace on. The brace definitely helps improve their function as well as stability. Verbal and written instructions for the use, wear schedule, cleaning, and application of this brace were given. Patient was instructed that should the brace result in increased pain, decreased sensation, numbness and/or tingling, increased swelling, or an overall worsening of their medical condition; to please contact our office immediately. Orthotic/brace management and training was provided for skin care, modifications due to healing tissues, edema changes, interruption in skin integrity, and safety precautions with the Orthosis/brace.     Diagnostic studies:      Activity Instructions, Restrictions, and Accommodations:      Consultations/Referrals:  Physical therapy    Follow-up:  Follow up 2 weeks  Total appointment time _30_ minutes. Greater than 50% spent counseling patient on results of physical exam, treatment options as well as results of ordered imaging and treatment for results, need for PT and expected outcomes, as well as discussing possible medications.    CHANELLE DOWNS on 7/16/24 at 11:59 AM.     Chanelle Downs CNP

## 2024-07-30 ENCOUNTER — OFFICE VISIT (OUTPATIENT)
Dept: SPORTS MEDICINE | Facility: CLINIC | Age: 59
End: 2024-07-30
Payer: COMMERCIAL

## 2024-07-30 VITALS
BODY MASS INDEX: 24.19 KG/M2 | HEART RATE: 80 BPM | SYSTOLIC BLOOD PRESSURE: 126 MMHG | HEIGHT: 59 IN | WEIGHT: 120 LBS | DIASTOLIC BLOOD PRESSURE: 72 MMHG

## 2024-07-30 DIAGNOSIS — S93.401D SPRAIN OF RIGHT ANKLE, SUBSEQUENT ENCOUNTER: ICD-10-CM

## 2024-07-30 DIAGNOSIS — S93.491D HIGH ANKLE SPRAIN, RIGHT, SUBSEQUENT ENCOUNTER: ICD-10-CM

## 2024-07-30 DIAGNOSIS — S93.411D SPRAIN OF CALCANEOFIBULAR LIGAMENT OF RIGHT ANKLE, SUBSEQUENT ENCOUNTER: ICD-10-CM

## 2024-07-30 DIAGNOSIS — M25.571 PAIN IN JOINT OF RIGHT ANKLE: ICD-10-CM

## 2024-07-30 DIAGNOSIS — S93.491D SPRAIN OF ANTERIOR TALOFIBULAR LIGAMENT OF RIGHT ANKLE, SUBSEQUENT ENCOUNTER: Primary | ICD-10-CM

## 2024-07-30 DIAGNOSIS — S99.911D RIGHT ANKLE INJURY, SUBSEQUENT ENCOUNTER: ICD-10-CM

## 2024-07-30 PROBLEM — S93.491A SPRAIN OF ANTERIOR TALOFIBULAR LIGAMENT OF RIGHT ANKLE: Status: ACTIVE | Noted: 2024-07-30

## 2024-07-30 PROBLEM — S93.411A SPRAIN OF CALCANEOFIBULAR LIGAMENT OF RIGHT ANKLE: Status: ACTIVE | Noted: 2024-07-30

## 2024-07-30 PROCEDURE — 99214 OFFICE O/P EST MOD 30 MIN: CPT | Performed by: FAMILY MEDICINE

## 2024-07-30 ASSESSMENT — PAIN DESCRIPTION - DESCRIPTORS: DESCRIPTORS: ACHING

## 2024-07-30 ASSESSMENT — ENCOUNTER SYMPTOMS
LOSS OF SENSATION IN FEET: 0
DEPRESSION: 0
OCCASIONAL FEELINGS OF UNSTEADINESS: 0

## 2024-07-30 ASSESSMENT — PAIN SCALES - GENERAL
PAINLEVEL: 2
PAINLEVEL_OUTOF10: 2

## 2024-07-30 ASSESSMENT — PAIN - FUNCTIONAL ASSESSMENT: PAIN_FUNCTIONAL_ASSESSMENT: 0-10

## 2024-07-30 ASSESSMENT — LIFESTYLE VARIABLES: HOW MANY STANDARD DRINKS CONTAINING ALCOHOL DO YOU HAVE ON A TYPICAL DAY: PATIENT DOES NOT DRINK

## 2024-07-30 NOTE — PROGRESS NOTES
Verbal consent of the patient and/or verbal parental consent for patients under the age of 18 have been obtained to conduct a physical examination at this office visit.    Established patient  History Of Present Illness  07/30/24 Nelda Parra is a 59 y.o. female who presents for her a reevaluation of her RIGHT ankle. She is s/p x2 weeks tall XP walking boot as directed at her last office visit. She performs her home strengthening and stretching exercises as directed by her Physical Therapist, Darius BLOUNT. She reports discontinuing use of the boot this past Friday citing her low back pain flared-up with use. She describes her pain as an ache along the dorsum of her right foot proximal insertion of her tib/fib and calcaneal ligaments. She says while camping over the weekend she ran after an infant that was headed towards the road and being the nearest adult she took off running.  she says periodically she takes some anti-inflammatories and Tylenol for pain and discomfort but it does not seem to be helping when it is flared up.  She admits she is not limping as much as she did previously.    She does still have significant amount of pain in the lateral aspect of her right ankle and in the front part of the ankle and she points in the region of the high ankle ligaments.  She reports experiencing a sharp pain that prompted her to stop running, and with rest she states the pain subsided. She rates her pain at 2/10 today, noting pain reaches 3/10 at its greatest. She takes OTC Tylenol for pain management with mild, temporary relief.    All previous Progress Notes and imaging results related to this patients chief complaint have been reviewed in preparation for this examination.    Past Medical History  She has no past medical history on file.    Surgical History  She has a past surgical history that includes MR arthrogram shoulder right w FL guided injection (Right, 12/18/2013) and MR angio head wo IV contrast (1/15/2020).    "  Social History  She reports that she has never smoked. She has never used smokeless tobacco. She reports that she does not currently use alcohol. She reports that she does not use drugs.    Family History  No family history on file.     Allergies  Nsaids (non-steroidal anti-inflammatory drug), Naproxen, and Sulfa (sulfonamide antibiotics)    Historical Clinical Intake:  07/16/24 Nelda Parra is a 59 y.o. female presenting with RIGHT ankle pain. Pt states that last night while walking outside between her driveway and grass, her foot \"rolled\". Pain located on the top of the ankle in to the lower leg. She presents wearing an air splint that she previously had but wanted to wear for support. Slight swelling present. Pain with all range of motion. Denies any numbness or tingling. No previous history of injury that she can recall. Rates current pain as a 5/10 sitting and states that it can increase to a 10/10.  We reviewed patient x-ray results in detail.  No acute fractures or dislocations noted.  After discussion we will place patient into a tall walking boot and have her follow-up in 2 weeks for reevaluation with consideration of MRI or CT is indicated for possible high ankle sprain or occult fracture.  Patient verbalizes understanding and agreement with plan of care.     Review of Systems  CONSTITUTIONAL:   Negative for weight change, loss of appetite, fatigue, weakness, fever, chills, night sweats, headaches .           HEENT:   Negative for cold, cough, sore throat, sinus pain, swollen lymph nodes.           OPHTHALMOLOGY:   Negative for diminished vision, blurred vision, loss of vision, double vision.           ALLERGY:   Negative for runny nose, scratchy throat, sinus congestion, rash, facial pressure, nasal congestion, post-nasal drip.           CARDIOLOGY:   Negative for chest pain, palpitations, murmurs, irregular heart beat, shortness of breath, leg edema, dyspnea on exertion, fatigue, dizziness.         "   RESPIRATORY:   Negative for chest pain, shortness of breath, swelling of the legs, asthma/copd, chest congestion, pain with breathing .           GASTROENTEROLOGY:   Negative for nausea, vomitting, heartburn, constipation, diarrhea, blood in stool, change in bowel habits, black stool.           HEMATOLOGY/LYMPH:   Negative for fatigue, loss of appetitie, easy bruising, easy bleeding, anemia, abnormal bleeding, slow healing.           ENDOCRINOLOGY:   Negative for polyuria, polydipsia, polyphagia, fatigue, weight loss, weight gain, cold intolerance, heat intolerance, diabetes.           MUSCULOSKELETAL:   Positive  for Right foot joint pain        DERMATOLOGY:   Negative for rash, bruising.           NEUROLOGY:   Negative for tingling, numbness, gait abnormality, paresthesias, weakness, sciatica.            Examination  Right Ankle  Edema: Positive  Laterally  Ecchymosis/Bruising: Negative   Percussion Test: Positive   distal fibula  Tuning Fork Test: Positive  distal fibula     Orientation:    Positive Asymmetrical, because of  swelling.  lateral ankle     ROM:   Positive, Decreased.  dorsiflexion and eversion as well as plantarflexion and inversion           Muscle Strength:   Positive +4/+5 Planar Flexion, Decreased due to pain  Positive +4/+5 Dorsi Flexion, Decreased due to pain         Positive +3/+5 Inversion, Decreased due to pain  Positive +3/+5 Eversion, Decreased due to pain       Positive +4/+5 Plantarflexion in combination with inversion, Decreased due to pain  Positive +4/+5 Plantarflexion in combination with eversion, Decreased due to pain        Positive +4/+5 Dorsiflexion in combination with inversion (Posterior Tibialis), Decreased due to pain  Positive +4/+5 Dorsiflexion in combination with eversion (Peroneal Brevis), Decreased due to pain  Positive +4/+5 Dorsiflexion in combination with eversion and flexion of great toe (Peroneal Longus), Decreased due to pain.            Palpation:   Positive   Painful, Tender to Palpation over her RIGHT distal fibula, ATFL, CFL, as well as anterior inferior tib/fib  and over the base of the fifth metatarsal    Vascular:   +2/+4 Dorsalis Pedis  +2/+4 Posterior Tibialis  Capillary Refill < 2 Seconds.      Leg/Ankle/Foot - Ankle Impingement:  Posterior Ankle Impingement Test: Positive   Anterior Ankle Impingement Test: Negative.            Leg/Ankle/Foot - Ankle Instability:  Flexibility Test:  Positive   Anterior Drawer Test:  Positive    Talar Tilt Test:  Negative  External Rotation Kleiger Plantar Flexion Test:  Positive   External Rotation Kleiger Dorsiflexion Test:  Positive   Squeeze Test:  Positive, Distal tibia and fibula.   Dorsiflexion Test:  Positive   Posterior Tibial Instability Test: Negative.  Peroneal Tendon Instability Test:  Positive  Horizontal Squeeze Test:  Positive  Vertical Squeeze Test:  Negative.   Plantarflexion:  Positive  Standing/Walking Test:  Positive, Painful.   Tibial Torsion Test:  Negative       Leg/Ankle/Foot - DVT:  Tom's Sign: Negative.            Leg/Ankle/Foot - Forefoot:  Strunsky Test:  Negative.   Gaenslen Maneuver Test:  Negative.   Metatarsal Tap Test:  Negative.   Crepitation Test:  Negative.          Leg/Ankle/Foot - Hindfoot Achilles/Calcaneus:  Carroll Compression Test: Negative.   Hoffa Sign:  Negative.   Achilles Tendon Tap Test:  Negative.   Heel Compression Test:  Negative.   Heel Thump Test:  Positive         Feet/Foot:   Positive BILATERAL Valgus foot        Imaging and Diagnostics Review:      IMPRESSION RIGHT ANKLE XR 7/17/2024:  Remote ankle sprain with some lateral malleolar heterotopic  ossification. No acute findings.      Signed by: Robert Núñez 7/17/2024 2:28 PM  Dictation workstation:   [id number]    Assessment   1. Sprain of anterior talofibular ligament of right ankle, subsequent encounter  MR ankle right wo IV contrast      2. Pain in joint of right ankle  MR ankle right wo IV contrast      3. Right ankle  injury, subsequent encounter  MR ankle right wo IV contrast      4. Sprain of right ankle, subsequent encounter  MR ankle right wo IV contrast      5. High ankle sprain, right, subsequent encounter  MR ankle right wo IV contrast      6. Sprain of calcaneofibular ligament of right ankle, subsequent encounter  MR ankle right wo IV contrast          Treatment or Intervention:  May continue to use  ice and moist heat therapy as needed   Stressed the importance of continuing to wear Tall XP Walking Boot their RIGHT ankle injury/condition as previously recommended  however since it has been bothering her too much we will put her into an ankle brace  Stressed the importance of starting into Physical Therapy  with Darius Patino  1-2 times a week for 8-10 weeks with manual therapy as well as dry needling and IASTM at [facility] with Darius MOLINA (as desired by patient)  Reviewed home exercises to be performed by the patient routinely, including stretching exercises  Reviewed ice bucket icing and exercises to be performed while icing a minimum of twice daily   Stressed the importance of wearing shoes with good stability control to help with the biomechanics affecting the lower legs  Stressed the importance of wearing full foot insoles to help with the biomechanics affecting the lower legs  Recommendation to continue taking over-the-counter calcium with vitamin-D 2 -3000+ milligrams a day, as well as OTC symphytum as directed daily to promote bony healing, in addition to a daily multivitamin.  Recommendation over-the-counter curcumin, turmeric, boswellia, as well as egg shell membrane as directed to aid with joint inflammation.   Recommendation over-the-counter Move Free for joint health.  May take OTC Tylenol Extra Strength or OTC Tylenol Arthritis, taking one every 6-8 hours with food as needed for pain management.  Patient advised regarding the risks and/or potential adverse reactions and/or side effects of any prescribed medications along  with any over-the-counter medications or any supplements used. Patient advised to seek immediate medical care if any adverse reactions occur. The patient and/or patient(s) parent(s) verbalized their understanding.  Discussed in detail with the patient to the level of their understanding the possibility in the future of regenerative injections versus corticosteroid injections  Patient was given a stabilizing ankle brace for their RIGHT ankle injury/condition. The patient is ambulatory with or without aid and feels more stable with the brace on. The brace definitely helps improve their function as well as stability. Verbal and written instructions for the use, wear schedule, cleaning, and application of this brace were given. Patient was instructed that should the brace result in increased pain, decreased sensation, numbness and/or tingling, increased swelling, or an overall worsening of their medical condition; to please contact our office immediately. Orthotic/brace management and training was provided for skin care, modifications due to healing tissues, edema changes, interruption in skin integrity, and safety precautions with the Orthosis/brace.  You have been ordered an MRI of the RIGHT ankle. Once you contact scheduling at [phone] and obtain the date and time of your MRI, contact our office at [phone] to schedule your follow-up appointment to review your results. Please note the results of your imaging will not be discussed over the telephone.  Follow-up after RIGHT ankle MRI or sooner or needed    Please note that this report has been produced using speech recognition software.  It may contain errors related to grammar, punctuation or spelling.  Electronically signed, but not reviewed.  GRACIE Martin, Director of Sports Medicine    CAROLEE SHERWOOD on 7/30/24 at 11:38 AM.     BRIAN Martin DO

## 2024-07-30 NOTE — PATIENT INSTRUCTIONS
Treatment or Intervention:  May continue to use PRICE therapy as needed   Stressed the importance of continuing to wear Tall XP Walking Boot their RIGHT ankle injury/condition as previously recommended  Stressed the importance of starting into Physical Therapy 1-2 times a week for 8-10 weeks with manual therapy as well as dry needling and IASTM at [facility] with Darius MOLINA (as desired by patient)  Reviewed home exercises to be performed by the patient routinely, including stretching exercises  Reviewed ice bucket icing and exercises to be performed while icing a minimum of twice daily   Stressed the importance of wearing shoes with good stability control to help with the biomechanics affecting the lower legs  Stressed the importance of wearing full foot insoles to help with the biomechanics affecting the lower legs  Recommendation to continue taking over-the-counter calcium with vitamin-D 2 -3000+ milligrams a day, as well as OTC symphytum as directed daily to promote bony healing, in addition to a daily multivitamin.  Recommendation over-the-counter curcumin, turmeric, boswellia, as well as egg shell membrane as directed to aid with joint inflammation.   Recommendation over-the-counter Move Free for joint health.  May take OTC Tylenol Extra Strength or OTC Tylenol Arthritis, taking one every 6-8 hours with food as needed for pain management.  Patient advised regarding the risks and/or potential adverse reactions and/or side effects of any prescribed medications along with any over-the-counter medications or any supplements used. Patient advised to seek immediate medical care if any adverse reactions occur. The patient and/or patient(s) parent(s) verbalized their understanding.  Discussed in detail with the patient to the level of their understanding the possibility in the future of regenerative injections versus corticosteroid injections  Patient was given a stabilizing ankle brace for their RIGHT ankle injury/condition. The  patient is ambulatory with or without aid and feels more stable with the brace on. The brace definitely helps improve their function as well as stability. Verbal and written instructions for the use, wear schedule, cleaning, and application of this brace were given. Patient was instructed that should the brace result in increased pain, decreased sensation, numbness and/or tingling, increased swelling, or an overall worsening of their medical condition; to please contact our office immediately. Orthotic/brace management and training was provided for skin care, modifications due to healing tissues, edema changes, interruption in skin integrity, and safety precautions with the Orthosis/brace.  You have been ordered an MRI of the RIGHT ankle. Once you contact scheduling at [phone] and obtain the date and time of your MRI, contact our office at [phone] to schedule your follow-up appointment to review your results. Please note the results of your imaging will not be discussed over the telephone.  Follow-up after RIGHT ankle MRI or sooner or needed

## 2024-08-13 ENCOUNTER — APPOINTMENT (OUTPATIENT)
Dept: RADIOLOGY | Facility: CLINIC | Age: 59
End: 2024-08-13
Payer: COMMERCIAL

## 2024-08-13 ENCOUNTER — HOSPITAL ENCOUNTER (OUTPATIENT)
Dept: RADIOLOGY | Facility: CLINIC | Age: 59
Discharge: HOME | End: 2024-08-13
Payer: COMMERCIAL

## 2024-08-13 DIAGNOSIS — S93.491D HIGH ANKLE SPRAIN, RIGHT, SUBSEQUENT ENCOUNTER: ICD-10-CM

## 2024-08-13 DIAGNOSIS — S93.411D SPRAIN OF CALCANEOFIBULAR LIGAMENT OF RIGHT ANKLE, SUBSEQUENT ENCOUNTER: ICD-10-CM

## 2024-08-13 DIAGNOSIS — S93.401D SPRAIN OF RIGHT ANKLE, SUBSEQUENT ENCOUNTER: ICD-10-CM

## 2024-08-13 DIAGNOSIS — S93.491D SPRAIN OF ANTERIOR TALOFIBULAR LIGAMENT OF RIGHT ANKLE, SUBSEQUENT ENCOUNTER: ICD-10-CM

## 2024-08-13 DIAGNOSIS — S99.911D RIGHT ANKLE INJURY, SUBSEQUENT ENCOUNTER: ICD-10-CM

## 2024-08-13 DIAGNOSIS — M25.571 PAIN IN JOINT OF RIGHT ANKLE: ICD-10-CM

## 2024-08-13 PROCEDURE — 73721 MRI JNT OF LWR EXTRE W/O DYE: CPT | Mod: RIGHT SIDE | Performed by: RADIOLOGY

## 2024-08-13 PROCEDURE — 73721 MRI JNT OF LWR EXTRE W/O DYE: CPT | Mod: RT

## 2024-08-22 ENCOUNTER — APPOINTMENT (OUTPATIENT)
Dept: PHYSICAL THERAPY | Facility: CLINIC | Age: 59
End: 2024-08-22
Payer: COMMERCIAL

## 2024-09-16 ENCOUNTER — APPOINTMENT (OUTPATIENT)
Dept: SPORTS MEDICINE | Facility: CLINIC | Age: 59
End: 2024-09-16
Payer: COMMERCIAL

## 2024-10-02 ENCOUNTER — OFFICE VISIT (OUTPATIENT)
Dept: SPORTS MEDICINE | Facility: CLINIC | Age: 59
End: 2024-10-02
Payer: COMMERCIAL

## 2024-10-02 VITALS
DIASTOLIC BLOOD PRESSURE: 80 MMHG | WEIGHT: 120 LBS | HEIGHT: 59 IN | HEART RATE: 70 BPM | SYSTOLIC BLOOD PRESSURE: 120 MMHG | BODY MASS INDEX: 24.19 KG/M2

## 2024-10-02 DIAGNOSIS — S93.491D HIGH ANKLE SPRAIN, RIGHT, SUBSEQUENT ENCOUNTER: ICD-10-CM

## 2024-10-02 DIAGNOSIS — S93.421D SPRAIN OF DELTOID LIGAMENT OF RIGHT ANKLE, SUBSEQUENT ENCOUNTER: Primary | ICD-10-CM

## 2024-10-02 DIAGNOSIS — S99.911D RIGHT ANKLE INJURY, SUBSEQUENT ENCOUNTER: ICD-10-CM

## 2024-10-02 DIAGNOSIS — S93.401D SPRAIN OF RIGHT ANKLE, SUBSEQUENT ENCOUNTER: ICD-10-CM

## 2024-10-02 DIAGNOSIS — S93.491D SPRAIN OF ANTERIOR TALOFIBULAR LIGAMENT OF RIGHT ANKLE, SUBSEQUENT ENCOUNTER: ICD-10-CM

## 2024-10-02 DIAGNOSIS — S93.411D SPRAIN OF CALCANEOFIBULAR LIGAMENT OF RIGHT ANKLE, SUBSEQUENT ENCOUNTER: ICD-10-CM

## 2024-10-02 DIAGNOSIS — M25.571 PAIN IN JOINT OF RIGHT ANKLE: ICD-10-CM

## 2024-10-02 DIAGNOSIS — M65.969 TENOSYNOVITIS OF EXTENSOR DIGITORUM LONGUS TENDON: ICD-10-CM

## 2024-10-02 PROCEDURE — 99214 OFFICE O/P EST MOD 30 MIN: CPT | Performed by: FAMILY MEDICINE

## 2024-10-02 PROCEDURE — 3008F BODY MASS INDEX DOCD: CPT | Performed by: FAMILY MEDICINE

## 2024-10-02 ASSESSMENT — COLUMBIA-SUICIDE SEVERITY RATING SCALE - C-SSRS
6. HAVE YOU EVER DONE ANYTHING, STARTED TO DO ANYTHING, OR PREPARED TO DO ANYTHING TO END YOUR LIFE?: NO
2. HAVE YOU ACTUALLY HAD ANY THOUGHTS OF KILLING YOURSELF?: NO
1. IN THE PAST MONTH, HAVE YOU WISHED YOU WERE DEAD OR WISHED YOU COULD GO TO SLEEP AND NOT WAKE UP?: NO

## 2024-10-02 ASSESSMENT — ENCOUNTER SYMPTOMS
DEPRESSION: 0
OCCASIONAL FEELINGS OF UNSTEADINESS: 0
LOSS OF SENSATION IN FEET: 0

## 2024-10-02 ASSESSMENT — PAIN - FUNCTIONAL ASSESSMENT: PAIN_FUNCTIONAL_ASSESSMENT: NO/DENIES PAIN

## 2024-10-02 ASSESSMENT — PATIENT HEALTH QUESTIONNAIRE - PHQ9
2. FEELING DOWN, DEPRESSED OR HOPELESS: NOT AT ALL
SUM OF ALL RESPONSES TO PHQ9 QUESTIONS 1 AND 2: 0
1. LITTLE INTEREST OR PLEASURE IN DOING THINGS: NOT AT ALL

## 2024-10-02 ASSESSMENT — PAIN SCALES - GENERAL: PAINLEVEL: 0-NO PAIN

## 2024-10-02 NOTE — PROGRESS NOTES
"Verbal consent of the patient and/or verbal parental consent for patients under the age of 18 have been obtained to conduct a physical examination at this office visit.    Established patient  History Of Present Illness  10/03/24 Nelda Parra is a 59 y.o. female who presents for her a reevaluation and MRI follow up of her RIGHT ankle. States 0/10 pain in the ankle today. No new symptoms or worsening of symptoms.  She says overall her ankle feels like it is back to normal.  She denies any numbness, tingling, pins and needle sensation.  She denies any nighttime pain.  She has been working on her range of motion exercises daily.  Looking back she says that she does recall injuring this ankle years ago.  We discussed her MRI results in detail.  States that her low back is more problematic today.    All previous Progress Notes and imaging results related to this patients chief complaint have been reviewed in preparation for this examination.    Past Medical History  She has no past medical history on file.    Surgical History  She has a past surgical history that includes MR arthrogram shoulder right w FL guided injection (Right, 12/18/2013) and MR angio head wo IV contrast (1/15/2020).     Social History  She reports that she has never smoked. She has never used smokeless tobacco. She reports that she does not currently use alcohol. She reports that she does not use drugs.    Family History  No family history on file.     Allergies  Nsaids (non-steroidal anti-inflammatory drug), Naproxen, and Sulfa (sulfonamide antibiotics)    Historical Clinical Intake:  07/16/24 Nelda Parra is a 59 y.o. female presenting with RIGHT ankle pain. Pt states that last night while walking outside between her driveway and grass, her foot \"rolled\". Pain located on the top of the ankle in to the lower leg. She presents wearing an air splint that she previously had but wanted to wear for support. Slight swelling present. Pain with all " range of motion. Denies any numbness or tingling. No previous history of injury that she can recall. Rates current pain as a 5/10 sitting and states that it can increase to a 10/10.  We reviewed patient x-ray results in detail.  No acute fractures or dislocations noted.  After discussion we will place patient into a tall walking boot and have her follow-up in 2 weeks for reevaluation with consideration of MRI or CT is indicated for possible high ankle sprain or occult fracture.  Patient verbalizes understanding and agreement with plan of care.   ---------------------------------  07/30/24 Nelda Parra is a 59 y.o. female who presents for her a reevaluation of her RIGHT ankle. She is s/p x2 weeks tall XP walking boot as directed at her last office visit. She performs her home strengthening and stretching exercises as directed by her Physical Therapist, Darius BLOUNT. She reports discontinuing use of the boot this past Friday citing her low back pain flared-up with use. She describes her pain as an ache along the dorsum of her right foot proximal insertion of her tib/fib and calcaneal ligaments. She says while camping over the weekend she ran after an infant that was headed towards the road and being the nearest adult she took off running.  she says periodically she takes some anti-inflammatories and Tylenol for pain and discomfort but it does not seem to be helping when it is flared up.  She admits she is not limping as much as she did previously.    She does still have significant amount of pain in the lateral aspect of her right ankle and in the front part of the ankle and she points in the region of the high ankle ligaments.  She reports experiencing a sharp pain that prompted her to stop running, and with rest she states the pain subsided. She rates her pain at 2/10 today, noting pain reaches 3/10 at its greatest. She takes OTC Tylenol for pain management with mild, temporary relief.   Review of  Systems  CONSTITUTIONAL:   Negative for weight change, loss of appetite, fatigue, weakness, fever, chills, night sweats, headaches .           HEENT:   Negative for cold, cough, sore throat, sinus pain, swollen lymph nodes.           OPHTHALMOLOGY:   Negative for diminished vision, blurred vision, loss of vision, double vision.           ALLERGY:   Negative for runny nose, scratchy throat, sinus congestion, rash, facial pressure, nasal congestion, post-nasal drip.           CARDIOLOGY:   Negative for chest pain, palpitations, murmurs, irregular heart beat, shortness of breath, leg edema, dyspnea on exertion, fatigue, dizziness.           RESPIRATORY:   Negative for chest pain, shortness of breath, swelling of the legs, asthma/copd, chest congestion, pain with breathing .           GASTROENTEROLOGY:   Negative for nausea, vomitting, heartburn, constipation, diarrhea, blood in stool, change in bowel habits, black stool.           HEMATOLOGY/LYMPH:   Negative for fatigue, loss of appetitie, easy bruising, easy bleeding, anemia, abnormal bleeding, slow healing.           ENDOCRINOLOGY:   Negative for polyuria, polydipsia, polyphagia, fatigue, weight loss, weight gain, cold intolerance, heat intolerance, diabetes.           MUSCULOSKELETAL:   Positive  for Right foot joint pain        DERMATOLOGY:   Negative for rash, bruising.           NEUROLOGY:   Negative for tingling, numbness, gait abnormality, paresthesias, weakness, sciatica.            Examination  Right Ankle  Edema: Negative   Ecchymosis/Bruising: Negative   Percussion Test: Negative   Tuning Fork Test: Negative      Orientation:     Negative symmetrical     ROM:    Negative full range of motion           Muscle Strength:   +5/+5 Planar Flexion  +5/+5 Dorsi Flexion  +5/+5 Inversion  +5/+5 Eversion,       +5/+5 Plantarflexion in combination with inversion  +5/+5 Plantarflexion in combination with eversion,      +5/+5 Dorsiflexion in combination with inversion  (Posterior Tibialis)  +5/+5 Dorsiflexion in combination with eversion (Peroneal Brevis),   +5/+5 Dorsiflexion in combination with eversion and flexion of great toe (Peroneal Longus),           Palpation:    Negative tender to palpation    Vascular:   +2/+4 Dorsalis Pedis  +2/+4 Posterior Tibialis  Capillary Refill < 2 Seconds.      Leg/Ankle/Foot - Ankle Impingement:  Posterior Ankle Impingement Test: Negative   Anterior Ankle Impingement Test: Negative.            Leg/Ankle/Foot - Ankle Instability:  Flexibility Test:  Negative   Anterior Drawer Test: Negative   Talar Tilt Test:  Negative  External Rotation Kleiger Plantar Flexion Test: Negative   External Rotation Kleiger Dorsiflexion Test:  Negative   Squeeze Test:  Negative   Dorsiflexion Test:  Negative   Posterior Tibial Instability Test: Negative.  Peroneal Tendon Instability Test: Negative   Horizontal Squeeze Test:  Negative   Vertical Squeeze Test:  Negative.   Plantarflexion:  Negative   Standing/Walking Test:   Negative   Tibial Torsion Test:  Negative       Leg/Ankle/Foot - DVT:  Tom's Sign: Negative.            Leg/Ankle/Foot - Forefoot:  Strunsky Test:  Negative.   Gaenslen Maneuver Test:  Negative.   Metatarsal Tap Test:  Negative.   Crepitation Test:  Negative.          Leg/Ankle/Foot - Hindfoot Achilles/Calcaneus:  Carroll Compression Test: Negative.   Hoffa Sign:  Negative.   Achilles Tendon Tap Test:  Negative.   Heel Compression Test:  Negative.   Heel Thump Test:  Negative       Feet/Foot:   Positive BILATERAL Valgus foot        Imaging and Diagnostics Review:      IMPRESSION RIGHT ANKLE XR 7/17/2024:  Remote ankle sprain with some lateral malleolar heterotopic  ossification. No acute findings.      Signed by: Robert Núñez 7/17/2024 2:28 PM  Dictation workstation:   [id number]    ------------------------------------------------------    Interpreted By:  Krystin Rodriguez,   STUDY:  MR ANKLE RIGHT WO IV CONTRAST 8/13/2024 9:22  am  INDICATION:  Signs/Symptoms:right ankle pain, possibilty of tear vs fracture vs  other  COMPARISON:  X-ray 07/16/2024  ACCESSION NUMBER(S):  [id number]  ORDERING CLINICIAN:  CAROLEE SHERWOOD  TECHNIQUE:  Multiplanar and multisequence MR images were obtained of the right  ankle.    FINDINGS:  Anterior inferior tibiofibular ligament is intact. Posterior inferior  tibiofibular ligament is intact. Distal interosseous tibiofibular  ligament is intact.      Anterior talofibular ligament demonstrates sequela of chronic sprain  with remote cortical avulsion fragment from the tip of the lateral  malleolus identified measuring 7 mm. There is scar of the ligament to  the lateral malleolus. Mild surrounding periligamentous edema.  Correlate with a acute on chronic sprain. Calcaneofibular ligament  demonstrates chronic thickened appearance. The ligament is seen  separate from the cortical avulsion fragment of the attachment of the  anterior talofibular ligament. There is periligamentous edema with a  acute on chronic sprain of the calcaneofibular ligament suggested. No  focal discontinuity. Posterior talofibular ligament is intact.      Posterior tibiotalar portion deep deltoid ligament is intact.  Visualized components superficial deltoid ligament demonstrate  demonstrate periligamentous edema and low-grade sprain. Spring  ligament complex is intact.      Sinus tarsi is unremarkable. Subtalar joint demonstrates a small  effusion in particular in the posterior recess of the ankle joint.  Ankle joint demonstrates no osteochondral defect. There is minimal  subchondral intermediate signal intensity posterior margin of the  lateral talar dome.      Posterior tibial tendon is intact the navicular tuberosity. Distal  tendon slips intact. Flexor digitorum and flexor hallucis longus  tendons are unremarkable. Tarsal tunnel is unremarkable. Incidental  note is made of flexor digitorum accessorius longus about the tarsal  tunnel.       Peroneus brevis tendon demonstrates low-lying peroneus brevis  myotendinous junction. The tendon is intact distally. Mild peroneus  longus tendinosis. No peroneal tenosynovitis. Tibialis anterior and  extensor tendons are intact. There is mild tenosynovitis about the  extensor digitorum tendons at the level of the ankle joint line.      Achilles tendon is unremarkable. No retrocalcaneal or retro Achilles  bursitis. Plantar fascia demonstrates perifascial edema about the  central cord of the plantar fascia at the calcaneal tuberosity  without focal fascial tear or rupture. Lateral cord is unremarkable.  Musculature of the foot demonstrates no asymmetric atrophy or edema.      Midfoot and intertarsal articulations demonstrate mild calcaneocuboid  osteoarthritis with mild subcortical cystic change intermediate  signal intensity along the outer margin. Lisfranc joint is congruent.  There is mild 2nd and 3rd tarsometatarsal joint osteoarthritis.  Visualized portions metatarsal bases are unremarkable.      IMPRESSION:  1. Acute on chronic sprain of the anterior talofibular and  calcaneofibular ligaments with remote cortical avulsion fragment of  the anterior talofibular ligament from the fibular attachment with  surrounding periligamentous edema demonstrated as well as thickening  of the ligament.  2. Low-grade sprain of the superficial deltoid ligament.  3. Mild extensor digitorum longus tenosynovitis at the level of the  ankle joint line.  4. No significant bone bruise or acute cortical avulsion fragment.      Signed by: Krystin Rodriguez 8/13/2024 10:56 AM  Dictation workstation:   [id number]    Assessment   1. Sprain of deltoid ligament of right ankle, subsequent encounter        2. Pain in joint of right ankle        3. Right ankle injury, subsequent encounter        4. Sprain of right ankle, subsequent encounter        5. High ankle sprain, right, subsequent encounter        6. Sprain of anterior talofibular  ligament of right ankle, subsequent encounter        7. Sprain of calcaneofibular ligament of right ankle, subsequent encounter        8. Tenosynovitis of extensor digitorum longus tendon              Treatment or Intervention:  May continue to use  ice and moist heat therapy as needed   Stressed the importance of continuing exercises learned in physical therapy regularly   Continue home exercises to be performed by the patient routinely, including stretching exercises  Continue as needed continue ice bucket icing and exercises to be performed while icing a minimum of twice daily   Continue wearing shoes with good stability control to help with the biomechanics affecting the lower legs  Stressed the importance of wearing full foot insoles to help with the biomechanics affecting the lower legs   continue taking over-the-counter vitamin-D 2 -3000+ milligrams a day, as well as a daily multivitamin.  Continue continue over-the-counter curcumin, turmeric, boswellia, as well as egg shell membrane as directed to aid with joint inflammation.   Continue over-the-counter Move Free for joint health.  May continue to take OTC Tylenol Extra Strength or OTC Tylenol Arthritis, taking one every 6-8 hours with food as needed for pain management.  Patient advised regarding the risks and/or potential adverse reactions and/or side effects of any prescribed medications along with any over-the-counter medications or any supplements used. Patient advised to seek immediate medical care if any adverse reactions occur. The patient and/or patient(s) parent(s) verbalized their understanding.  Discussed in detail with the patient to the level of their understanding the possibility in the future of regenerative injections   Continue using stabilizing ankle brace for their RIGHT ankle injury/condition as needed. The patient is ambulatory with or without aid and feels more stable with the brace on. The brace definitely helps improve their function  as well as stability. Verbal and written instructions for the use, wear schedule, cleaning, and application of this brace were given. Patient was instructed that should the brace result in increased pain, decreased sensation, numbness and/or tingling, increased swelling, or an overall worsening of their medical condition; to please contact our office immediately. Orthotic/brace management and training was provided for skin care, modifications due to healing tissues, edema changes, interruption in skin integrity, and safety precautions with the Orthosis/brace.  Follow-up as needed    Please note that this report has been produced using speech recognition software.  It may contain errors related to grammar, punctuation or spelling.  Electronically signed, but not reviewed.  GRACIE Martin, Director of Sports Medicine    CAROLEE SHERWOOD on 10/3/24 at 11:31 AM.     BRIAN Martin DO

## 2024-10-03 PROBLEM — M65.969: Status: ACTIVE | Noted: 2024-10-03

## 2024-10-03 PROBLEM — S93.421A SPRAIN OF DELTOID LIGAMENT OF RIGHT ANKLE: Status: ACTIVE | Noted: 2024-10-03
